# Patient Record
Sex: FEMALE | Race: WHITE | NOT HISPANIC OR LATINO | Employment: FULL TIME | URBAN - METROPOLITAN AREA
[De-identification: names, ages, dates, MRNs, and addresses within clinical notes are randomized per-mention and may not be internally consistent; named-entity substitution may affect disease eponyms.]

---

## 2017-01-26 ENCOUNTER — TELEPHONE (OUTPATIENT)
Dept: OBSTETRICS AND GYNECOLOGY | Facility: CLINIC | Age: 62
End: 2017-01-26

## 2017-01-26 DIAGNOSIS — N39.3 STRESS INCONTINENCE: Primary | ICD-10-CM

## 2017-01-26 RX ORDER — ESTRADIOL 1 MG/G
1 GEL TOPICAL DAILY
Qty: 9 G | Refills: 0 | Status: SHIPPED | OUTPATIENT
Start: 2017-01-26 | End: 2017-11-02

## 2017-01-26 RX ORDER — ESTRADIOL 0.25 MG/.25G
GEL TOPICAL
Status: CANCELLED | OUTPATIENT
Start: 2017-01-26

## 2017-01-26 RX ORDER — SODIUM CHLORIDE 0.9 % (FLUSH) 0.9 %
1-10 SYRINGE (ML) INJECTION AS NEEDED
Status: CANCELLED | OUTPATIENT
Start: 2017-01-26

## 2017-01-26 NOTE — TELEPHONE ENCOUNTER
----- Message from Tamara Phelps sent at 1/26/2017 10:42 AM EST -----  Regarding: needs estradiol switched  Contact: [phone]  Dr Garcia Pt called back weeks ago regarding Estrogel not being covered and never heard if it was being switched to something else    [facility].     Per Dr. Garcia give patient Divigel

## 2017-01-30 RX ORDER — ESTRADIOL 1 MG/G
1 GEL TOPICAL DAILY
Qty: 9 G | Refills: 0 | Status: SHIPPED | OUTPATIENT
Start: 2017-01-30 | End: 2017-11-02

## 2017-05-10 PROBLEM — IMO0002 CYSTOCELE: Status: ACTIVE | Noted: 2017-05-10

## 2017-07-06 RX ORDER — ESTRADIOL 1 MG/G
GEL TOPICAL
Qty: 90 G | Refills: 6 | Status: SHIPPED | OUTPATIENT
Start: 2017-07-06 | End: 2019-07-30

## 2017-09-29 ENCOUNTER — TELEPHONE (OUTPATIENT)
Dept: OBSTETRICS AND GYNECOLOGY | Facility: CLINIC | Age: 62
End: 2017-09-29

## 2017-10-03 ENCOUNTER — TELEPHONE (OUTPATIENT)
Dept: OBSTETRICS AND GYNECOLOGY | Facility: CLINIC | Age: 62
End: 2017-10-03

## 2017-10-03 NOTE — TELEPHONE ENCOUNTER
The second questions regarding her upcoming TVT on November 6.  She had read that she will be in the hospital for a week and off work for 3 months.  I told her that she would be a hospital hopefully overnight and off about 2 weeks with no heavy lifting ideally for the first 6 weeks.  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  Also wants to make sure this will not interfere with her sex life and it should not at all.  Should help if she doesn't have to worry about stress incontinence.

## 2017-11-02 ENCOUNTER — APPOINTMENT (OUTPATIENT)
Dept: PREADMISSION TESTING | Facility: HOSPITAL | Age: 62
End: 2017-11-02

## 2017-11-02 ENCOUNTER — OFFICE VISIT (OUTPATIENT)
Dept: OBSTETRICS AND GYNECOLOGY | Facility: CLINIC | Age: 62
End: 2017-11-02

## 2017-11-02 VITALS
BODY MASS INDEX: 27.96 KG/M2 | DIASTOLIC BLOOD PRESSURE: 80 MMHG | RESPIRATION RATE: 16 BRPM | WEIGHT: 168 LBS | SYSTOLIC BLOOD PRESSURE: 128 MMHG

## 2017-11-02 VITALS — HEIGHT: 65 IN | WEIGHT: 168.43 LBS | BODY MASS INDEX: 28.06 KG/M2

## 2017-11-02 DIAGNOSIS — N39.46 MIXED INCONTINENCE: ICD-10-CM

## 2017-11-02 DIAGNOSIS — N39.46 MIXED INCONTINENCE: Primary | ICD-10-CM

## 2017-11-02 PROBLEM — I10 HTN (HYPERTENSION): Status: ACTIVE | Noted: 2017-11-02

## 2017-11-02 PROBLEM — Z96.641 S/P HIP REPLACEMENT, RIGHT: Status: ACTIVE | Noted: 2017-11-02

## 2017-11-02 LAB
ANION GAP SERPL CALCULATED.3IONS-SCNC: 8 MMOL/L (ref 3–11)
BASOPHILS # BLD AUTO: 0.07 10*3/MM3 (ref 0–0.2)
BASOPHILS NFR BLD AUTO: 0.6 % (ref 0–1)
BUN BLD-MCNC: 14 MG/DL (ref 9–23)
BUN/CREAT SERPL: 20 (ref 7–25)
CALCIUM SPEC-SCNC: 9 MG/DL (ref 8.7–10.4)
CHLORIDE SERPL-SCNC: 104 MMOL/L (ref 99–109)
CO2 SERPL-SCNC: 25 MMOL/L (ref 20–31)
CREAT BLD-MCNC: 0.7 MG/DL (ref 0.6–1.3)
DEPRECATED RDW RBC AUTO: 45.4 FL (ref 37–54)
EOSINOPHIL # BLD AUTO: 0.14 10*3/MM3 (ref 0–0.3)
EOSINOPHIL NFR BLD AUTO: 1.2 % (ref 0–3)
ERYTHROCYTE [DISTWIDTH] IN BLOOD BY AUTOMATED COUNT: 12.6 % (ref 11.3–14.5)
GFR SERPL CREATININE-BSD FRML MDRD: 85 ML/MIN/1.73
GLUCOSE BLD-MCNC: 78 MG/DL (ref 70–100)
HCT VFR BLD AUTO: 47.6 % (ref 34.5–44)
HGB BLD-MCNC: 16.2 G/DL (ref 11.5–15.5)
IMM GRANULOCYTES # BLD: 0.05 10*3/MM3 (ref 0–0.03)
IMM GRANULOCYTES NFR BLD: 0.4 % (ref 0–0.6)
LYMPHOCYTES # BLD AUTO: 1.95 10*3/MM3 (ref 0.6–4.8)
LYMPHOCYTES NFR BLD AUTO: 16.5 % (ref 24–44)
MCH RBC QN AUTO: 33.5 PG (ref 27–31)
MCHC RBC AUTO-ENTMCNC: 34 G/DL (ref 32–36)
MCV RBC AUTO: 98.3 FL (ref 80–99)
MONOCYTES # BLD AUTO: 0.8 10*3/MM3 (ref 0–1)
MONOCYTES NFR BLD AUTO: 6.8 % (ref 0–12)
NEUTROPHILS # BLD AUTO: 8.81 10*3/MM3 (ref 1.5–8.3)
NEUTROPHILS NFR BLD AUTO: 74.5 % (ref 41–71)
PLATELET # BLD AUTO: 264 10*3/MM3 (ref 150–450)
PMV BLD AUTO: 10.7 FL (ref 6–12)
POTASSIUM BLD-SCNC: 4.1 MMOL/L (ref 3.5–5.5)
RBC # BLD AUTO: 4.84 10*6/MM3 (ref 3.89–5.14)
SODIUM BLD-SCNC: 137 MMOL/L (ref 132–146)
WBC NRBC COR # BLD: 11.82 10*3/MM3 (ref 3.5–10.8)

## 2017-11-02 PROCEDURE — S0260 H&P FOR SURGERY: HCPCS | Performed by: OBSTETRICS & GYNECOLOGY

## 2017-11-02 PROCEDURE — 36415 COLL VENOUS BLD VENIPUNCTURE: CPT

## 2017-11-02 PROCEDURE — 80048 BASIC METABOLIC PNL TOTAL CA: CPT | Performed by: OBSTETRICS & GYNECOLOGY

## 2017-11-02 PROCEDURE — 93010 ELECTROCARDIOGRAM REPORT: CPT | Performed by: INTERNAL MEDICINE

## 2017-11-02 PROCEDURE — 93005 ELECTROCARDIOGRAM TRACING: CPT

## 2017-11-02 PROCEDURE — 85025 COMPLETE CBC W/AUTO DIFF WBC: CPT | Performed by: OBSTETRICS & GYNECOLOGY

## 2017-11-02 RX ORDER — AMLODIPINE BESYLATE 2.5 MG/1
2.5 TABLET ORAL NIGHTLY
COMMUNITY

## 2017-11-02 RX ORDER — NICOTINE POLACRILEX 2 MG
1 GUM BUCCAL DAILY
COMMUNITY

## 2017-11-02 RX ORDER — MELOXICAM 7.5 MG/1
7.5 TABLET ORAL DAILY
COMMUNITY
End: 2017-11-07

## 2017-11-02 RX ORDER — IBANDRONATE SODIUM 150 MG/1
150 TABLET, FILM COATED ORAL
Qty: 3 TABLET | Refills: 5 | Status: SHIPPED | OUTPATIENT
Start: 2017-11-02 | End: 2019-01-16 | Stop reason: SDUPTHER

## 2017-11-02 RX ORDER — HYDROXYZINE HYDROCHLORIDE 25 MG/1
25 TABLET, FILM COATED ORAL 3 TIMES DAILY PRN
COMMUNITY
End: 2022-02-08

## 2017-11-02 RX ORDER — FUROSEMIDE 20 MG/1
20 TABLET ORAL DAILY
COMMUNITY
End: 2022-02-08

## 2017-11-02 RX ORDER — SODIUM CHLORIDE 0.9 % (FLUSH) 0.9 %
1-10 SYRINGE (ML) INJECTION AS NEEDED
Status: CANCELLED | OUTPATIENT
Start: 2017-11-02

## 2017-11-02 RX ORDER — LISINOPRIL 2.5 MG/1
2.5 TABLET ORAL NIGHTLY
COMMUNITY

## 2017-11-02 RX ORDER — MAGNESIUM 200 MG
TABLET ORAL
COMMUNITY
End: 2017-11-02

## 2017-11-02 NOTE — H&P
Subjective   Shannan Vigil is a 62 y.o. year old  who is scheduled  for surgery due to stress urinary incontinence( mixed ). S/p hysterectomy endometriosis. Failed Kegels and timed voids; interferes with ADL also wet in am - discussed that may persist. May need medical treatment - Vesicare did not help.    Past Medical History:   Diagnosis Date   • Arthritis    • Endometriosis     h/o   • Osteopenia      Past Surgical History:   Procedure Laterality Date   • HYSTERECTOMY      Complete w/ silva oophorectomy   • OOPHORECTOMY Bilateral      Social History     Social History   • Marital status:      Spouse name: N/A   • Number of children: N/A   • Years of education: N/A     Social History Main Topics   • Smoking status: Never Smoker   • Smokeless tobacco: Never Used   • Alcohol use Yes      Comment: social   • Drug use: No   • Sexual activity: Yes     Partners: Male     Other Topics Concern   • None     Social History Narrative       Current Outpatient Prescriptions:   •  amLODIPine (NORVASC) 2.5 MG tablet, Take 2.5 mg by mouth Daily., Disp: , Rfl:   •  Biotin 1 MG capsule, Take  by mouth., Disp: , Rfl:   •  Cholecalciferol (VITAMIN D3) 12647 UNITS tablet, Take 1 tablet by mouth 2 (Two) Times a Week., Disp: , Rfl:   •  Cyanocobalamin (VITAMIN B-12) 1000 MCG sublingual tablet, Place  under the tongue., Disp: , Rfl:   •  DIVIGEL 1 MG/GM gel, APPLY 1 PACKET ON SKIN DAILY, Disp: 90 g, Rfl: 6  •  furosemide (LASIX) 20 MG tablet, Take 20 mg by mouth 2 (Two) Times a Day., Disp: , Rfl:   •  hydrOXYzine (ATARAX) 25 MG tablet, Take 25 mg by mouth 3 (Three) Times a Day As Needed for Itching., Disp: , Rfl:   •  ibandronate (BONIVA) 150 MG tablet, Take 1 tablet by mouth Every 30 (Thirty) Days., Disp: 3 tablet, Rfl: 5  •  lisinopril (PRINIVIL,ZESTRIL) 2.5 MG tablet, Take 5 mg by mouth Daily., Disp: , Rfl:   •  meloxicam (MOBIC) 7.5 MG tablet, Take 7.5 mg by mouth Daily., Disp: , Rfl:   •  omeprazole (priLOSEC) 40  MG capsule, Take 40 mg by mouth Daily., Disp: , Rfl:   •  TESTOSTERONE IM, Inject 1 mL into the shoulder, thigh, or buttocks Every 30 (Thirty) Days., Disp: , Rfl:     No Known Allergies  Smoking status: Never Smoker                                                              Smokeless status: Never Used                        Review of Systems      Objective   /80  Resp 16  Wt 168 lb (76.2 kg)  BMI 27.96 kg/m2  General: well developed; well nourished  no acute distress  tanned   Heart: regular rate and rhythm, S1, S2 normal, no murmur, click, rub or gallop   Lungs: breathing is unlabored  clear to auscultation bilaterally   Abdomen: soft, non-tender; no masses  no umbilical or inginual hernias are present  no hepato-splenomegaly  incision is healed   Pelvis:: Labia slightly erythematous minimal cystocele while supine on bimanual examination there seems to be fairly good support to the apex and not significant cystocele.          Assessment   1. Mixed incontinence with stress component   2. Minimal cystocele.  May need to consider repair vaginally if TVT does not adequately support anterior component  3. S/p hysterectomy BSO  4. Recent right hip replacement in February 5. She has labs from Sept 2017     Plan   TVT Exact on Monday possible anterior colporrhaphy  NPO 8 hours   PAT   No shaving pubic hair( did today)      Ronnie Garcia M.D.  11/2/2017

## 2017-11-06 ENCOUNTER — ANESTHESIA EVENT (OUTPATIENT)
Dept: PERIOP | Facility: HOSPITAL | Age: 62
End: 2017-11-06

## 2017-11-06 ENCOUNTER — HOSPITAL ENCOUNTER (OUTPATIENT)
Facility: HOSPITAL | Age: 62
Discharge: HOME | End: 2017-11-07
Attending: OBSTETRICS & GYNECOLOGY | Admitting: OBSTETRICS & GYNECOLOGY

## 2017-11-06 ENCOUNTER — ANESTHESIA (OUTPATIENT)
Dept: PERIOP | Facility: HOSPITAL | Age: 62
End: 2017-11-06

## 2017-11-06 DIAGNOSIS — N39.3 STRESS INCONTINENCE IN FEMALE: ICD-10-CM

## 2017-11-06 DIAGNOSIS — N39.3 STRESS INCONTINENCE: ICD-10-CM

## 2017-11-06 DIAGNOSIS — N81.11 CYSTOCELE, MIDLINE: Primary | ICD-10-CM

## 2017-11-06 LAB — POTASSIUM BLDA-SCNC: 4 MMOL/L (ref 3.5–5.3)

## 2017-11-06 PROCEDURE — 84132 ASSAY OF SERUM POTASSIUM: CPT | Performed by: ANESTHESIOLOGY

## 2017-11-06 PROCEDURE — 25010000002 MIDAZOLAM PER 1 MG: Performed by: NURSE ANESTHETIST, CERTIFIED REGISTERED

## 2017-11-06 PROCEDURE — 25010000002 KETOROLAC TROMETHAMINE PER 15 MG: Performed by: NURSE ANESTHETIST, CERTIFIED REGISTERED

## 2017-11-06 PROCEDURE — 25010000003 CEFAZOLIN IN DEXTROSE 2-4 GM/100ML-% SOLUTION: Performed by: OBSTETRICS & GYNECOLOGY

## 2017-11-06 PROCEDURE — 57288 REPAIR BLADDER DEFECT: CPT | Performed by: OBSTETRICS & GYNECOLOGY

## 2017-11-06 PROCEDURE — 25010000002 DEXAMETHASONE PER 1 MG: Performed by: NURSE ANESTHETIST, CERTIFIED REGISTERED

## 2017-11-06 PROCEDURE — 25010000002 PHENYLEPHRINE PER 1 ML: Performed by: OBSTETRICS & GYNECOLOGY

## 2017-11-06 PROCEDURE — 57240 ANTERIOR COLPORRHAPHY: CPT | Performed by: OBSTETRICS & GYNECOLOGY

## 2017-11-06 PROCEDURE — C1771 REP DEV, URINARY, W/SLING: HCPCS | Performed by: OBSTETRICS & GYNECOLOGY

## 2017-11-06 PROCEDURE — 25010000002 PROPOFOL 10 MG/ML EMULSION: Performed by: NURSE ANESTHETIST, CERTIFIED REGISTERED

## 2017-11-06 PROCEDURE — 25010000002 ONDANSETRON PER 1 MG: Performed by: NURSE ANESTHETIST, CERTIFIED REGISTERED

## 2017-11-06 PROCEDURE — 25010000002 FENTANYL CITRATE (PF) 100 MCG/2ML SOLUTION: Performed by: NURSE ANESTHETIST, CERTIFIED REGISTERED

## 2017-11-06 PROCEDURE — 25010000002 SUCCINYLCHOLINE PER 20 MG: Performed by: NURSE ANESTHETIST, CERTIFIED REGISTERED

## 2017-11-06 DEVICE — SLNG TVT EXACT CONTINENCE SYS BX/1EA: Type: IMPLANTABLE DEVICE | Site: UTERUS | Status: FUNCTIONAL

## 2017-11-06 RX ORDER — DEXAMETHASONE SODIUM PHOSPHATE 4 MG/ML
INJECTION, SOLUTION INTRA-ARTICULAR; INTRALESIONAL; INTRAMUSCULAR; INTRAVENOUS; SOFT TISSUE AS NEEDED
Status: DISCONTINUED | OUTPATIENT
Start: 2017-11-06 | End: 2017-11-06 | Stop reason: SURG

## 2017-11-06 RX ORDER — PROMETHAZINE HYDROCHLORIDE 25 MG/ML
6.25 INJECTION, SOLUTION INTRAMUSCULAR; INTRAVENOUS ONCE AS NEEDED
Status: DISCONTINUED | OUTPATIENT
Start: 2017-11-06 | End: 2017-11-06 | Stop reason: HOSPADM

## 2017-11-06 RX ORDER — ALBUTEROL SULFATE 90 UG/1
AEROSOL, METERED RESPIRATORY (INHALATION) AS NEEDED
Status: DISCONTINUED | OUTPATIENT
Start: 2017-11-06 | End: 2017-11-06 | Stop reason: SURG

## 2017-11-06 RX ORDER — MIDAZOLAM HYDROCHLORIDE 1 MG/ML
INJECTION INTRAMUSCULAR; INTRAVENOUS AS NEEDED
Status: DISCONTINUED | OUTPATIENT
Start: 2017-11-06 | End: 2017-11-06 | Stop reason: SURG

## 2017-11-06 RX ORDER — ACETAMINOPHEN 650 MG/1
650 SUPPOSITORY RECTAL EVERY 4 HOURS PRN
Status: DISCONTINUED | OUTPATIENT
Start: 2017-11-06 | End: 2017-11-07 | Stop reason: HOSPADM

## 2017-11-06 RX ORDER — ONDANSETRON 4 MG/1
4 TABLET, FILM COATED ORAL EVERY 6 HOURS PRN
Status: DISCONTINUED | OUTPATIENT
Start: 2017-11-06 | End: 2017-11-07 | Stop reason: HOSPADM

## 2017-11-06 RX ORDER — SODIUM CHLORIDE 0.9 % (FLUSH) 0.9 %
1-10 SYRINGE (ML) INJECTION AS NEEDED
Status: DISCONTINUED | OUTPATIENT
Start: 2017-11-06 | End: 2017-11-06 | Stop reason: HOSPADM

## 2017-11-06 RX ORDER — ACETAMINOPHEN 325 MG/1
650 TABLET ORAL EVERY 4 HOURS PRN
Status: DISCONTINUED | OUTPATIENT
Start: 2017-11-06 | End: 2017-11-07 | Stop reason: HOSPADM

## 2017-11-06 RX ORDER — PANTOPRAZOLE SODIUM 40 MG/1
40 TABLET, DELAYED RELEASE ORAL NIGHTLY
Status: DISCONTINUED | OUTPATIENT
Start: 2017-11-06 | End: 2017-11-07 | Stop reason: HOSPADM

## 2017-11-06 RX ORDER — ONDANSETRON 2 MG/ML
4 INJECTION INTRAMUSCULAR; INTRAVENOUS EVERY 6 HOURS PRN
Status: DISCONTINUED | OUTPATIENT
Start: 2017-11-06 | End: 2017-11-07 | Stop reason: HOSPADM

## 2017-11-06 RX ORDER — MAGNESIUM HYDROXIDE 1200 MG/15ML
LIQUID ORAL AS NEEDED
Status: DISCONTINUED | OUTPATIENT
Start: 2017-11-06 | End: 2017-11-06 | Stop reason: HOSPADM

## 2017-11-06 RX ORDER — MEPERIDINE HYDROCHLORIDE 50 MG/ML
12.5 INJECTION INTRAMUSCULAR; INTRAVENOUS; SUBCUTANEOUS
Status: DISCONTINUED | OUTPATIENT
Start: 2017-11-06 | End: 2017-11-06 | Stop reason: HOSPADM

## 2017-11-06 RX ORDER — HYDROCODONE BITARTRATE AND ACETAMINOPHEN 5; 325 MG/1; MG/1
1 TABLET ORAL EVERY 4 HOURS PRN
Status: DISCONTINUED | OUTPATIENT
Start: 2017-11-06 | End: 2017-11-07 | Stop reason: HOSPADM

## 2017-11-06 RX ORDER — PROMETHAZINE HYDROCHLORIDE 25 MG/1
25 SUPPOSITORY RECTAL ONCE AS NEEDED
Status: DISCONTINUED | OUTPATIENT
Start: 2017-11-06 | End: 2017-11-06 | Stop reason: HOSPADM

## 2017-11-06 RX ORDER — SODIUM CHLORIDE, SODIUM LACTATE, POTASSIUM CHLORIDE, CALCIUM CHLORIDE 600; 310; 30; 20 MG/100ML; MG/100ML; MG/100ML; MG/100ML
9 INJECTION, SOLUTION INTRAVENOUS CONTINUOUS
Status: DISCONTINUED | OUTPATIENT
Start: 2017-11-06 | End: 2017-11-06

## 2017-11-06 RX ORDER — KETOROLAC TROMETHAMINE 30 MG/ML
INJECTION, SOLUTION INTRAMUSCULAR; INTRAVENOUS AS NEEDED
Status: DISCONTINUED | OUTPATIENT
Start: 2017-11-06 | End: 2017-11-06 | Stop reason: SURG

## 2017-11-06 RX ORDER — PROPOFOL 10 MG/ML
VIAL (ML) INTRAVENOUS AS NEEDED
Status: DISCONTINUED | OUTPATIENT
Start: 2017-11-06 | End: 2017-11-06 | Stop reason: SURG

## 2017-11-06 RX ORDER — FAMOTIDINE 10 MG/ML
20 INJECTION, SOLUTION INTRAVENOUS ONCE
Status: DISCONTINUED | OUTPATIENT
Start: 2017-11-06 | End: 2017-11-06 | Stop reason: HOSPADM

## 2017-11-06 RX ORDER — LIDOCAINE HYDROCHLORIDE 10 MG/ML
0.5 INJECTION, SOLUTION EPIDURAL; INFILTRATION; INTRACAUDAL; PERINEURAL ONCE AS NEEDED
Status: COMPLETED | OUTPATIENT
Start: 2017-11-06 | End: 2017-11-06

## 2017-11-06 RX ORDER — SIMETHICONE 80 MG
80 TABLET,CHEWABLE ORAL 4 TIMES DAILY PRN
Status: DISCONTINUED | OUTPATIENT
Start: 2017-11-06 | End: 2017-11-07 | Stop reason: HOSPADM

## 2017-11-06 RX ORDER — FUROSEMIDE 20 MG/1
20 TABLET ORAL DAILY
Status: DISCONTINUED | OUTPATIENT
Start: 2017-11-06 | End: 2017-11-07 | Stop reason: HOSPADM

## 2017-11-06 RX ORDER — SODIUM CHLORIDE 9 MG/ML
INJECTION, SOLUTION INTRAVENOUS CONTINUOUS PRN
Status: DISCONTINUED | OUTPATIENT
Start: 2017-11-06 | End: 2017-11-06 | Stop reason: HOSPADM

## 2017-11-06 RX ORDER — AMLODIPINE BESYLATE 2.5 MG/1
2.5 TABLET ORAL NIGHTLY
Status: DISCONTINUED | OUTPATIENT
Start: 2017-11-06 | End: 2017-11-07 | Stop reason: HOSPADM

## 2017-11-06 RX ORDER — CEFAZOLIN SODIUM 2 G/100ML
2 INJECTION, SOLUTION INTRAVENOUS
Status: DISCONTINUED | OUTPATIENT
Start: 2017-11-06 | End: 2017-11-06 | Stop reason: HOSPADM

## 2017-11-06 RX ORDER — KETOROLAC TROMETHAMINE 30 MG/ML
30 INJECTION, SOLUTION INTRAMUSCULAR; INTRAVENOUS EVERY 6 HOURS PRN
Status: DISCONTINUED | OUTPATIENT
Start: 2017-11-06 | End: 2017-11-07 | Stop reason: HOSPADM

## 2017-11-06 RX ORDER — HYDROXYZINE HYDROCHLORIDE 25 MG/1
25 TABLET, FILM COATED ORAL 3 TIMES DAILY PRN
Status: DISCONTINUED | OUTPATIENT
Start: 2017-11-06 | End: 2017-11-07 | Stop reason: HOSPADM

## 2017-11-06 RX ORDER — LIDOCAINE HYDROCHLORIDE 10 MG/ML
INJECTION, SOLUTION EPIDURAL; INFILTRATION; INTRACAUDAL; PERINEURAL AS NEEDED
Status: DISCONTINUED | OUTPATIENT
Start: 2017-11-06 | End: 2017-11-06 | Stop reason: SURG

## 2017-11-06 RX ORDER — HYDROMORPHONE HYDROCHLORIDE 1 MG/ML
0.5 INJECTION, SOLUTION INTRAMUSCULAR; INTRAVENOUS; SUBCUTANEOUS
Status: DISCONTINUED | OUTPATIENT
Start: 2017-11-06 | End: 2017-11-06 | Stop reason: HOSPADM

## 2017-11-06 RX ORDER — IBUPROFEN 400 MG/1
400 TABLET ORAL EVERY 6 HOURS PRN
Status: DISCONTINUED | OUTPATIENT
Start: 2017-11-06 | End: 2017-11-07 | Stop reason: HOSPADM

## 2017-11-06 RX ORDER — HYDROCODONE BITARTRATE AND ACETAMINOPHEN 10; 325 MG/1; MG/1
1 TABLET ORAL EVERY 4 HOURS PRN
Status: DISCONTINUED | OUTPATIENT
Start: 2017-11-06 | End: 2017-11-07 | Stop reason: HOSPADM

## 2017-11-06 RX ORDER — MEPERIDINE HYDROCHLORIDE 25 MG/ML
12.5 INJECTION INTRAMUSCULAR; INTRAVENOUS; SUBCUTANEOUS
Status: DISCONTINUED | OUTPATIENT
Start: 2017-11-06 | End: 2017-11-06 | Stop reason: HOSPADM

## 2017-11-06 RX ORDER — FENTANYL CITRATE 50 UG/ML
INJECTION, SOLUTION INTRAMUSCULAR; INTRAVENOUS AS NEEDED
Status: DISCONTINUED | OUTPATIENT
Start: 2017-11-06 | End: 2017-11-06 | Stop reason: SURG

## 2017-11-06 RX ORDER — POTASSIUM CHLORIDE 750 MG/1
10 CAPSULE, EXTENDED RELEASE ORAL EVERY EVENING
Status: DISCONTINUED | OUTPATIENT
Start: 2017-11-06 | End: 2017-11-07 | Stop reason: HOSPADM

## 2017-11-06 RX ORDER — ONDANSETRON 2 MG/ML
INJECTION INTRAMUSCULAR; INTRAVENOUS AS NEEDED
Status: DISCONTINUED | OUTPATIENT
Start: 2017-11-06 | End: 2017-11-06 | Stop reason: SURG

## 2017-11-06 RX ORDER — ESMOLOL HYDROCHLORIDE 10 MG/ML
INJECTION INTRAVENOUS AS NEEDED
Status: DISCONTINUED | OUTPATIENT
Start: 2017-11-06 | End: 2017-11-06 | Stop reason: SURG

## 2017-11-06 RX ORDER — DOCUSATE SODIUM 100 MG/1
100 CAPSULE, LIQUID FILLED ORAL 2 TIMES DAILY PRN
Status: DISCONTINUED | OUTPATIENT
Start: 2017-11-06 | End: 2017-11-07 | Stop reason: HOSPADM

## 2017-11-06 RX ORDER — CEFAZOLIN SODIUM 2 G/100ML
2 INJECTION, SOLUTION INTRAVENOUS EVERY 8 HOURS
Status: COMPLETED | OUTPATIENT
Start: 2017-11-06 | End: 2017-11-07

## 2017-11-06 RX ORDER — FENTANYL CITRATE 50 UG/ML
50 INJECTION, SOLUTION INTRAMUSCULAR; INTRAVENOUS
Status: DISCONTINUED | OUTPATIENT
Start: 2017-11-06 | End: 2017-11-06 | Stop reason: HOSPADM

## 2017-11-06 RX ORDER — BISACODYL 5 MG/1
10 TABLET, DELAYED RELEASE ORAL DAILY PRN
Status: DISCONTINUED | OUTPATIENT
Start: 2017-11-06 | End: 2017-11-07 | Stop reason: HOSPADM

## 2017-11-06 RX ORDER — BUPIVACAINE HYDROCHLORIDE 5 MG/ML
INJECTION, SOLUTION PERINEURAL AS NEEDED
Status: DISCONTINUED | OUTPATIENT
Start: 2017-11-06 | End: 2017-11-06 | Stop reason: HOSPADM

## 2017-11-06 RX ORDER — FAMOTIDINE 20 MG/1
20 TABLET, FILM COATED ORAL ONCE
Status: COMPLETED | OUTPATIENT
Start: 2017-11-06 | End: 2017-11-06

## 2017-11-06 RX ORDER — SUCCINYLCHOLINE CHLORIDE 20 MG/ML
INJECTION INTRAMUSCULAR; INTRAVENOUS AS NEEDED
Status: DISCONTINUED | OUTPATIENT
Start: 2017-11-06 | End: 2017-11-06 | Stop reason: SURG

## 2017-11-06 RX ORDER — LISINOPRIL 2.5 MG/1
2.5 TABLET ORAL NIGHTLY
Status: DISCONTINUED | OUTPATIENT
Start: 2017-11-06 | End: 2017-11-07 | Stop reason: HOSPADM

## 2017-11-06 RX ORDER — IPRATROPIUM BROMIDE AND ALBUTEROL SULFATE 2.5; .5 MG/3ML; MG/3ML
3 SOLUTION RESPIRATORY (INHALATION) ONCE AS NEEDED
Status: DISCONTINUED | OUTPATIENT
Start: 2017-11-06 | End: 2017-11-06 | Stop reason: HOSPADM

## 2017-11-06 RX ORDER — PROMETHAZINE HYDROCHLORIDE 25 MG/1
25 TABLET ORAL ONCE AS NEEDED
Status: DISCONTINUED | OUTPATIENT
Start: 2017-11-06 | End: 2017-11-06 | Stop reason: HOSPADM

## 2017-11-06 RX ORDER — ZOLPIDEM TARTRATE 5 MG/1
5 TABLET ORAL NIGHTLY PRN
Status: DISCONTINUED | OUTPATIENT
Start: 2017-11-06 | End: 2017-11-07 | Stop reason: HOSPADM

## 2017-11-06 RX ORDER — ESTRADIOL 0.05 MG/D
1 FILM, EXTENDED RELEASE TRANSDERMAL 2 TIMES WEEKLY
Status: DISCONTINUED | OUTPATIENT
Start: 2017-11-09 | End: 2017-11-07 | Stop reason: HOSPADM

## 2017-11-06 RX ORDER — DEXTROSE AND SODIUM CHLORIDE 5; .45 G/100ML; G/100ML
100 INJECTION, SOLUTION INTRAVENOUS CONTINUOUS
Status: DISCONTINUED | OUTPATIENT
Start: 2017-11-06 | End: 2017-11-07 | Stop reason: HOSPADM

## 2017-11-06 RX ADMIN — PROPOFOL 200 MG: 10 INJECTION, EMULSION INTRAVENOUS at 13:01

## 2017-11-06 RX ADMIN — LIDOCAINE HYDROCHLORIDE 50 MG: 10 INJECTION, SOLUTION EPIDURAL; INFILTRATION; INTRACAUDAL; PERINEURAL at 13:01

## 2017-11-06 RX ADMIN — KETOROLAC TROMETHAMINE 30 MG: 30 INJECTION, SOLUTION INTRAMUSCULAR at 14:32

## 2017-11-06 RX ADMIN — ESMOLOL HYDROCHLORIDE 20 MG: 10 INJECTION, SOLUTION INTRAVENOUS at 14:21

## 2017-11-06 RX ADMIN — LIDOCAINE HYDROCHLORIDE 2 ML: 10 INJECTION, SOLUTION EPIDURAL; INFILTRATION; INTRACAUDAL; PERINEURAL at 12:13

## 2017-11-06 RX ADMIN — SODIUM CHLORIDE, POTASSIUM CHLORIDE, SODIUM LACTATE AND CALCIUM CHLORIDE 9 ML/HR: 600; 310; 30; 20 INJECTION, SOLUTION INTRAVENOUS at 12:13

## 2017-11-06 RX ADMIN — MIDAZOLAM HYDROCHLORIDE 2 MG: 1 INJECTION, SOLUTION INTRAMUSCULAR; INTRAVENOUS at 12:54

## 2017-11-06 RX ADMIN — SODIUM CHLORIDE, POTASSIUM CHLORIDE, SODIUM LACTATE AND CALCIUM CHLORIDE: 600; 310; 30; 20 INJECTION, SOLUTION INTRAVENOUS at 14:14

## 2017-11-06 RX ADMIN — ONDANSETRON 4 MG: 2 INJECTION INTRAMUSCULAR; INTRAVENOUS at 14:32

## 2017-11-06 RX ADMIN — FENTANYL CITRATE 50 MCG: 50 INJECTION, SOLUTION INTRAMUSCULAR; INTRAVENOUS at 14:18

## 2017-11-06 RX ADMIN — LISINOPRIL 2.5 MG: 2.5 TABLET ORAL at 20:08

## 2017-11-06 RX ADMIN — HYDROCODONE BITARTRATE AND ACETAMINOPHEN 1 TABLET: 10; 325 TABLET ORAL at 20:09

## 2017-11-06 RX ADMIN — FENTANYL CITRATE 50 MCG: 50 INJECTION, SOLUTION INTRAMUSCULAR; INTRAVENOUS at 13:01

## 2017-11-06 RX ADMIN — MEPERIDINE HYDROCHLORIDE 12.5 MG: 50 INJECTION, SOLUTION INTRAMUSCULAR; INTRAVENOUS; SUBCUTANEOUS at 15:05

## 2017-11-06 RX ADMIN — FENTANYL CITRATE 50 MCG: 50 INJECTION, SOLUTION INTRAMUSCULAR; INTRAVENOUS at 14:21

## 2017-11-06 RX ADMIN — FENTANYL CITRATE 50 MCG: 50 INJECTION INTRAMUSCULAR; INTRAVENOUS at 15:30

## 2017-11-06 RX ADMIN — FENTANYL CITRATE 50 MCG: 50 INJECTION, SOLUTION INTRAMUSCULAR; INTRAVENOUS at 13:06

## 2017-11-06 RX ADMIN — ALBUTEROL SULFATE 4 PUFF: 90 AEROSOL, METERED RESPIRATORY (INHALATION) at 13:17

## 2017-11-06 RX ADMIN — AMLODIPINE BESYLATE 2.5 MG: 2.5 TABLET ORAL at 20:08

## 2017-11-06 RX ADMIN — POTASSIUM CHLORIDE 10 MEQ: 750 CAPSULE, EXTENDED RELEASE ORAL at 16:53

## 2017-11-06 RX ADMIN — PANTOPRAZOLE SODIUM 40 MG: 40 TABLET, DELAYED RELEASE ORAL at 20:09

## 2017-11-06 RX ADMIN — FAMOTIDINE 20 MG: 20 TABLET, FILM COATED ORAL at 12:13

## 2017-11-06 RX ADMIN — FENTANYL CITRATE 50 MCG: 50 INJECTION INTRAMUSCULAR; INTRAVENOUS at 15:20

## 2017-11-06 RX ADMIN — CEFAZOLIN SODIUM 2 G: 2 INJECTION, SOLUTION INTRAVENOUS at 21:48

## 2017-11-06 RX ADMIN — SUCCINYLCHOLINE CHLORIDE 100 MG: 20 INJECTION, SOLUTION INTRAMUSCULAR; INTRAVENOUS at 13:01

## 2017-11-06 RX ADMIN — FUROSEMIDE 20 MG: 20 TABLET ORAL at 16:55

## 2017-11-06 RX ADMIN — DEXTROSE AND SODIUM CHLORIDE 100 ML/HR: 5; 450 INJECTION, SOLUTION INTRAVENOUS at 15:56

## 2017-11-06 RX ADMIN — CEFAZOLIN SODIUM 2 G: 2 INJECTION, SOLUTION INTRAVENOUS at 12:59

## 2017-11-06 RX ADMIN — SODIUM CHLORIDE 500 ML: 9 INJECTION, SOLUTION INTRAVENOUS at 15:37

## 2017-11-06 RX ADMIN — ESMOLOL HYDROCHLORIDE 10 MG: 10 INJECTION, SOLUTION INTRAVENOUS at 14:37

## 2017-11-06 RX ADMIN — ESMOLOL HYDROCHLORIDE 20 MG: 10 INJECTION, SOLUTION INTRAVENOUS at 14:29

## 2017-11-06 RX ADMIN — DEXAMETHASONE SODIUM PHOSPHATE 8 MG: 4 INJECTION, SOLUTION INTRAMUSCULAR; INTRAVENOUS at 13:05

## 2017-11-06 RX ADMIN — EPHEDRINE SULFATE 5 MG: 50 INJECTION INTRAMUSCULAR; INTRAVENOUS; SUBCUTANEOUS at 14:05

## 2017-11-06 NOTE — H&P (VIEW-ONLY)
Subjective   Shannan Vigil is a 62 y.o. year old  who is scheduled  for surgery due to stress urinary incontinence( mixed ). S/p hysterectomy endometriosis. Failed Kegels and timed voids; interferes with ADL also wet in am - discussed that may persist. May need medical treatment - Vesicare did not help.    Past Medical History:   Diagnosis Date   • Arthritis    • Endometriosis     h/o   • Osteopenia      Past Surgical History:   Procedure Laterality Date   • HYSTERECTOMY      Complete w/ silva oophorectomy   • OOPHORECTOMY Bilateral      Social History     Social History   • Marital status:      Spouse name: N/A   • Number of children: N/A   • Years of education: N/A     Social History Main Topics   • Smoking status: Never Smoker   • Smokeless tobacco: Never Used   • Alcohol use Yes      Comment: social   • Drug use: No   • Sexual activity: Yes     Partners: Male     Other Topics Concern   • None     Social History Narrative       Current Outpatient Prescriptions:   •  amLODIPine (NORVASC) 2.5 MG tablet, Take 2.5 mg by mouth Daily., Disp: , Rfl:   •  Biotin 1 MG capsule, Take  by mouth., Disp: , Rfl:   •  Cholecalciferol (VITAMIN D3) 14145 UNITS tablet, Take 1 tablet by mouth 2 (Two) Times a Week., Disp: , Rfl:   •  Cyanocobalamin (VITAMIN B-12) 1000 MCG sublingual tablet, Place  under the tongue., Disp: , Rfl:   •  DIVIGEL 1 MG/GM gel, APPLY 1 PACKET ON SKIN DAILY, Disp: 90 g, Rfl: 6  •  furosemide (LASIX) 20 MG tablet, Take 20 mg by mouth 2 (Two) Times a Day., Disp: , Rfl:   •  hydrOXYzine (ATARAX) 25 MG tablet, Take 25 mg by mouth 3 (Three) Times a Day As Needed for Itching., Disp: , Rfl:   •  ibandronate (BONIVA) 150 MG tablet, Take 1 tablet by mouth Every 30 (Thirty) Days., Disp: 3 tablet, Rfl: 5  •  lisinopril (PRINIVIL,ZESTRIL) 2.5 MG tablet, Take 5 mg by mouth Daily., Disp: , Rfl:   •  meloxicam (MOBIC) 7.5 MG tablet, Take 7.5 mg by mouth Daily., Disp: , Rfl:   •  omeprazole (priLOSEC) 40  MG capsule, Take 40 mg by mouth Daily., Disp: , Rfl:   •  TESTOSTERONE IM, Inject 1 mL into the shoulder, thigh, or buttocks Every 30 (Thirty) Days., Disp: , Rfl:     No Known Allergies  Smoking status: Never Smoker                                                              Smokeless status: Never Used                        Review of Systems      Objective   /80  Resp 16  Wt 168 lb (76.2 kg)  BMI 27.96 kg/m2  General: well developed; well nourished  no acute distress  tanned   Heart: regular rate and rhythm, S1, S2 normal, no murmur, click, rub or gallop   Lungs: breathing is unlabored  clear to auscultation bilaterally   Abdomen: soft, non-tender; no masses  no umbilical or inginual hernias are present  no hepato-splenomegaly  incision is healed   Pelvis:: Labia slightly erythematous minimal cystocele while supine on bimanual examination there seems to be fairly good support to the apex and not significant cystocele.          Assessment   1. Mixed incontinence with stress component   2. Minimal cystocele.  May need to consider repair vaginally if TVT does not adequately support anterior component  3. S/p hysterectomy BSO  4. Recent right hip replacement in February 5. She has labs from Sept 2017     Plan   TVT Exact on Monday possible anterior colporrhaphy  NPO 8 hours   PAT   No shaving pubic hair( did today)      Ronnie Garcia M.D.  11/2/2017

## 2017-11-06 NOTE — ANESTHESIA PROCEDURE NOTES
Airway  Urgency: elective    Airway not difficult    General Information and Staff    Patient location during procedure: OR  CRNA: WYATT JUDGE    Indications and Patient Condition  Indications for airway management: airway protection    Preoxygenated: yes  MILS not maintained throughout  Mask difficulty assessment: 1 - vent by mask    Final Airway Details  Final airway type: endotracheal airway      Successful airway: ETT  Cuffed: yes   Successful intubation technique: direct laryngoscopy  Facilitating devices/methods: intubating stylet  Endotracheal tube insertion site: oral  Blade: Violet  Blade size: #3  ETT size: 7.0 mm  Cormack-Lehane Classification: grade I - full view of glottis  Placement verified by: chest auscultation and capnometry   Cuff volume (mL): 6  Measured from: lips  ETT to lips (cm): 20  Number of attempts at approach: 1    Additional Comments  Patient history, labs, physical exam, anesthetic plan reviewed with MDA and patient in preop.  Pt transported to room via RN. All ASA monitors applied, preoxygenated x 3 min/ ETO2 of >85, IV patent, smooth induction, easy intubation, + ETCO2, negative epigastric sounds, breath sound equal bilaterally with symmetric chest rise and fall, tube secured, all VSS, cspine neutrality maintained throughout induction, intubation and postitioning, all ppp, arms <90.

## 2017-11-06 NOTE — ANESTHESIA PROCEDURE NOTES
Airway  Urgency: elective    Airway not difficult    General Information and Staff    Patient location during procedure: OR  CRNA: WYATT JUDGE    Indications and Patient Condition  Indications for airway management: airway protection    Preoxygenated: yes  Mask difficulty assessment: 1 - vent by mask    Final Airway Details  Final airway type: supraglottic airway      Successful airway: classic  Size 4    Number of attempts at approach: 1    Additional Comments  LMA placed without difficulty, ventilation with assist, equal breath sounds and symmetric chest rise and fall

## 2017-11-06 NOTE — PLAN OF CARE
Problem: Pain, Acute (Adult)  Goal: Identify Related Risk Factors and Signs and Symptoms  Outcome: Outcome(s) achieved Date Met:  11/06/17 11/06/17 1803   Pain, Acute   Related Risk Factors (Acute Pain) procedure/treatment;surgery;infection;knowledge deficit;patient perception;positioning   Signs and Symptoms (Acute Pain) nausea/vomiting/anorexia;verbalization of pain descriptors;sleep pattern alteration       Goal: Acceptable Pain Control/Comfort Level  Outcome: Ongoing (interventions implemented as appropriate)    11/06/17 1803   Pain, Acute (Adult)   Acceptable Pain Control/Comfort Level making progress toward outcome

## 2017-11-06 NOTE — ANESTHESIA POSTPROCEDURE EVALUATION
Patient: Shannan Vigil    Procedure Summary     Date Anesthesia Start Anesthesia Stop Room / Location    11/06/17 1259  [facility] / [facility]       Procedure Diagnosis Surgeon Provider    TENSION FREE VAGINAL TAPING EXACT WITH  ANTERIOR AND POSTERIOR REPAIR (N/A Uterus) Stress incontinence  (Stress incontinence [N39.3]) MD Troy Lundberg MD          Anesthesia Type: general  Last vitals  /75   Temp 97.1   Pulse 106   Resp 16   SpO2 95     Post Anesthesia Care and Evaluation    Patient location during evaluation: PACU  Patient participation: complete - patient participated  Level of consciousness: awake and alert  Pain score: 0  Pain management: adequate  Airway patency: patent  Anesthetic complications: No anesthetic complications  PONV Status: none  Cardiovascular status: hemodynamically stable and acceptable  Respiratory status: nonlabored ventilation, acceptable and nasal cannula  Hydration status: acceptable

## 2017-11-06 NOTE — OP NOTE
Operative report    Preop diagnosis: Stress urinary incontinence (mixed).  Cystocele                              Status post and all hysterectomy and bilateral salpingo-oophorectomy    Postoperative diagnosis: Same    Procedure: Tension-free vaginal taping exact, associated cystoscopy                      Anterior colporrhaphy    Gen. anesthesia via LMA    Estimated blood loss: Less than 100 mL    Brief history and indications: This patient is a 62-year-old  3 para 2 who's had a history of urinary complaints.  She had mixed incontinence.  She was tried on anticholinergics and continued to have stress urinary incontinence.   Frequency had improved.  She had tried and failed Kegel exercises.  However the stress incontinence is interfering with activities of daily living.  She had a mild cystocele as well.  No rectal complaints or bowel movement complaints.  She failed conservative medical management.  She did have urethral descent and leakage on exam.  Options were discussed she understood risks and complications of the planned procedure included but were not limited to: failure of cure, urinary retention requiring self-catheterization, damage to surrounding structures including bladder, urethra and ureter.  They had opportunity to ask and have her questions answered satisfactorily she was scheduled for surgery.    Procedure: Patient taken operating room and placed under general anesthesia without complication.  She was prepped and draped standard fashion in a dorsal lithotomy position.  Exam revealed slight cystocele and rectocele along with urethrocele.  Exam under anesthesia revealed no adnexal or pelvic masses.  The 22 [place] Storz cystoscope was used. There were minor bleeding from  the urethra.  Initially, it was difficult to focus the camera.  No abnormalities were noted.  The bladder was drained.    Local was used suprapubically and stab wounds were made for the exit sites of the TVT trochars.  Local  "was used mid position suburethrally and bilaterally.  An incision was made in the vaginal mucosa at the mid urethra.  This was opened up bilaterally with Metzenbaum scissors.  The urethra was deviated to the right while the TVT exact was passed on the left side.  The trocar exited through the previous stab incision and was grasped suprapubically with a ring forcep.  A smaller 17 [place] [employer] cystoscope was used.  The trocar was in proper position- no foreign body was noted within the bladder.  The plastic sheath was brought up through the abdomen and the TVT tape tagged and cut.  The bladder was drained.  The urethral guide was used to deviate the urethra to the left while the TVT exact was passed on the right side towards the ipsilateral shoulder.  It exited through the stab incision on the right side.  The cystoscope was used to determine there was no foreign body/TVT trocar within the bladder.  The plastic sheath was brought up through the stab incision the tape tagged and cut.  The tape was  adjusted to the point where only a minimal amount of urine came out of the urethra with suprapubic pressure.  The Hegar trocar was freely mobile and there was no \"bump\" felt when passing the Hegar dilator.  The incision was closed with a chromic suture.  The stab incisions were closed with 3-0 plain sutures subcuticularly.  Hospital slight cystocele remained.  Dilute Doron-Synephrine was injected in the midline from just below the TVT incision to the vaginal cuff.  This cuff was grasped with an Allis clamp.  There was a band in the midline and there was released carefully with touch cautery.  The vaginal mucosa was teased away from the underlying vesicovaginal fascia using Metzenbaum scissors and a wet Ray-Aravind over gloved finger.  A small amount of excess vaginal mucosa was trimmed away.  Pursestring Vicryl suture was used to close the reinforce the cystocele.  A series of horizontal mattress sutures were placed in a second " layer.  Chromic was used to reapproximate the vaginal mucosa.  The vagina was irrigated and found to be hemostatic.    Sponge and needle counts were correct.  Reyes was placed to straight drainage.  Patient was awakened and taken to postop recovery area in stable condition.     Ronnie Garcia MD

## 2017-11-06 NOTE — PLAN OF CARE
Problem: Perioperative Period (Adult)  Goal: Signs and Symptoms of Listed Potential Problems Will be Absent or Manageable (Perioperative Period)  Outcome: Ongoing (interventions implemented as appropriate)    11/06/17 1152   Perioperative Period   Problems Assessed (Perioperative Period) pain   Problems Present (Perioperative Period) none

## 2017-11-06 NOTE — PLAN OF CARE
Problem: Patient Care Overview (Adult)  Goal: Plan of Care Review  Outcome: Ongoing (interventions implemented as appropriate)    11/06/17 1802   Coping/Psychosocial Response Interventions   Plan Of Care Reviewed With patient   Patient Care Overview   Progress progress toward functional goals as expected       Goal: Adult Individualization and Mutuality  Outcome: Ongoing (interventions implemented as appropriate)

## 2017-11-06 NOTE — ANESTHESIA PREPROCEDURE EVALUATION
Anesthesia Evaluation     Patient summary reviewed and Nursing notes reviewed   NPO Solid Status: > 8 hours  NPO Liquid Status: > 8 hours     Airway   Mallampati: II  TM distance: >3 FB  Neck ROM: full  no difficulty expected  Dental - normal exam     Pulmonary - normal exam   Cardiovascular   Exercise tolerance: good (4-7 METS)    Rhythm: regular  Rate: normal        Neuro/Psych  GI/Hepatic/Renal/Endo      Musculoskeletal     Abdominal    Substance History      OB/GYN          Other                                      Anesthesia Plan    ASA 2     general     intravenous induction   Anesthetic plan and risks discussed with patient.

## 2017-11-07 VITALS
SYSTOLIC BLOOD PRESSURE: 136 MMHG | WEIGHT: 168.43 LBS | RESPIRATION RATE: 18 BRPM | TEMPERATURE: 98.1 F | BODY MASS INDEX: 28.06 KG/M2 | HEART RATE: 71 BPM | OXYGEN SATURATION: 97 % | DIASTOLIC BLOOD PRESSURE: 74 MMHG | HEIGHT: 65 IN

## 2017-11-07 PROCEDURE — 25010000002 INFLUENZA VAC SPLIT QUAD 0.5 ML SUSPENSION PREFILLED SYRINGE: Performed by: OBSTETRICS & GYNECOLOGY

## 2017-11-07 PROCEDURE — G0008 ADMIN INFLUENZA VIRUS VAC: HCPCS | Performed by: OBSTETRICS & GYNECOLOGY

## 2017-11-07 PROCEDURE — 90686 IIV4 VACC NO PRSV 0.5 ML IM: CPT | Performed by: OBSTETRICS & GYNECOLOGY

## 2017-11-07 PROCEDURE — 25010000003 CEFAZOLIN IN DEXTROSE 2-4 GM/100ML-% SOLUTION: Performed by: OBSTETRICS & GYNECOLOGY

## 2017-11-07 RX ORDER — ACETAMINOPHEN 325 MG/1
650 TABLET ORAL EVERY 4 HOURS PRN
Start: 2017-11-07 | End: 2019-07-30

## 2017-11-07 RX ORDER — HYDROCODONE BITARTRATE AND ACETAMINOPHEN 5; 325 MG/1; MG/1
1 TABLET ORAL EVERY 6 HOURS PRN
Qty: 15 TABLET | Refills: 0 | Status: SHIPPED | OUTPATIENT
Start: 2017-11-07 | End: 2017-11-16

## 2017-11-07 RX ORDER — IBUPROFEN 400 MG/1
400 TABLET ORAL EVERY 6 HOURS PRN
Start: 2017-11-07 | End: 2019-07-30

## 2017-11-07 RX ADMIN — HYDROCODONE BITARTRATE AND ACETAMINOPHEN 1 TABLET: 10; 325 TABLET ORAL at 09:24

## 2017-11-07 RX ADMIN — DEXTROSE AND SODIUM CHLORIDE 100 ML/HR: 5; 450 INJECTION, SOLUTION INTRAVENOUS at 02:25

## 2017-11-07 RX ADMIN — INFLUENZA VIRUS VACCINE 0.5 ML: 15; 15; 15; 15 SUSPENSION INTRAMUSCULAR at 12:36

## 2017-11-07 RX ADMIN — HYDROCODONE BITARTRATE AND ACETAMINOPHEN 1 TABLET: 10; 325 TABLET ORAL at 14:15

## 2017-11-07 RX ADMIN — FUROSEMIDE 20 MG: 20 TABLET ORAL at 09:20

## 2017-11-07 RX ADMIN — ZOLPIDEM TARTRATE 5 MG: 5 TABLET, FILM COATED ORAL at 02:26

## 2017-11-07 RX ADMIN — CEFAZOLIN SODIUM 2 G: 2 INJECTION, SOLUTION INTRAVENOUS at 05:27

## 2017-11-07 NOTE — PLAN OF CARE
Problem: Patient Care Overview (Adult)  Goal: Plan of Care Review  Outcome: Ongoing (interventions implemented as appropriate)    11/06/17 1802 11/07/17 0405 11/07/17 0743   Coping/Psychosocial Response Interventions   Plan Of Care Reviewed With --  --  patient   Patient Care Overview   Progress progress toward functional goals as expected --  --    Outcome Evaluation   Outcome Summary/Follow up Plan --  VSS, pt slept little this shift, ready to go home, pain covered 1x --        Goal: Adult Individualization and Mutuality  Outcome: Ongoing (interventions implemented as appropriate)    11/07/17 0748   Individualization   Patient Specific Goals pain management       Goal: Discharge Needs Assessment  Outcome: Ongoing (interventions implemented as appropriate)    11/06/17 1216   Self-Care   Equipment Currently Used at Home none         Problem: Pain, Acute (Adult)  Intervention: Monitor/Manage Analgesia    11/06/17 2009 11/07/17 0743   Safety Interventions   Medication Review/Management medications reviewed --    Manage Acute Burn Pain   Pain Management Interventions --  pillow support       Intervention: Mutually Develop/Implement Acute Pain Management Plan    11/07/17 0743   Manage Acute Burn Pain   Pain Management Interventions pillow support       Intervention: Support/Optimize Psychosocial Response to Acute Pain    11/06/17 1744 11/07/17 0743   Coping Strategies   Trust Relationship/Rapport care explained;questions answered;emotional support provided --    Diversional Activities --  smartphone;television   Family/Support System Care support provided --    Supportive Measures --  active listening utilized         Goal: Acceptable Pain Control/Comfort Level  Outcome: Ongoing (interventions implemented as appropriate)    11/06/17 1803   Pain, Acute (Adult)   Acceptable Pain Control/Comfort Level making progress toward outcome

## 2017-11-07 NOTE — PROGRESS NOTES
[place]  : 1955  MRN: [id number]  CSN: [id number]    Post-operative Day #1  Subjective   Her pain is well controlled. She is passing gas. Her bowels are working normally.     Objective     Min/max vitals past 24 hours:   Temp  Min: 97.1 °F (36.2 °C)  Max: 98.6 °F (37 °C)  BP  Min: 112/56  Max: 143/82  Pulse  Min: 83  Max: 123  Resp  Min: 16  Max: 18        I/O last 3 completed shifts:  In: 2400 [I.V.:2400]  Out: 3025 [Urine:2900; Blood:125]    General: well developed; well nourished  no acute distress   Abdomen: soft, non-tender; no masses  no umbilical or inginual hernias are present  no hepato-splenomegaly   Pelvic: Not performed   Ext: Calves NT            Assessment   1. Post-op Day #1 S/P TVT and Anterior repair  2. She has not voided yet     Plan   1. Continue routine post-operative care   2. I will check later today - home is she is able to void properly    Ronnie Garcia MD  2017  8:30 AM

## 2017-11-07 NOTE — PROGRESS NOTES
Discharge Planning Assessment  [facility]     Patient Name: Shannan Vigil  MRN: [id number]  Today's Date: 11/7/2017    Admit Date: 11/6/2017          Discharge Needs Assessment       11/07/17 1027    Living Environment    Lives With child(elaine), adult    Living Arrangements house   2 story home    Home Accessibility bed and bath on same level    Number of Stairs to Enter Home 4    Number of Stairs Within Home 12    Stair Railings at Home inside, present on left side    Type of Financial/Environmental Concern none    Transportation Available car;family or friend will provide    Living Environment    Provides Primary Care For no one    Primary Care Provided By child(elaine) (specify)   supportive son and daughter in law staying with her    Quality Of Family Relationships supportive    Able to Return to Prior Living Arrangements yes    Discharge Needs Assessment    Concerns To Be Addressed no discharge needs identified    Readmission Within The Last 30 Days no previous admission in last 30 days    Anticipated Changes Related to Illness other (see comments)   post surgical recuperation    Equipment Currently Used at Home none    Equipment Needed After Discharge none    Discharge Disposition home or self-care    Discharge Contact Information if Applicable [phone]            Discharge Plan       11/07/17 1029    Case Management/Social Work Plan    Plan home    Patient/Family In Agreement With Plan yes    Additional Comments Anticipates discharge to home with no discharge planning needs identified.         Discharge Placement     No information found                Demographic Summary       11/07/17 1026    Referral Information    Referral Source admission list    Record Reviewed medical record    Contact Information    Permission Granted to Share Information With     Primary Care Physician Information    Name Julee Gant            Functional Status       11/07/17 1026    Functional Status Current     Ambulation 0-->independent    Transferring 0-->independent    Toileting 0-->independent    Bathing 0-->independent    Dressing 0-->independent    Eating 0-->independent    Communication 0-->understands/communicates without difficulty    Functional Status Prior    Ambulation 0-->independent    Transferring 0-->independent    Toileting 0-->independent    Bathing 0-->independent    Dressing 0-->independent    Eating 0-->independent    Communication 0-->understands/communicates without difficulty    IADL    Medications independent    Meal Preparation independent    Housekeeping independent    Laundry independent    Shopping independent    Oral Care independent    Activity Tolerance    Current Activity Limitations other (see comments)   post surgical restrictions    Usual Activity Tolerance excellent    Current Activity Tolerance excellent    Employment/Financial    Employment/Finance Comments Has [place] insurance with no known concerns            Psychosocial     None            Abuse/Neglect     None            Legal     None            Substance Abuse     None            Patient Forms     None          Ranjana Heller RN

## 2017-11-07 NOTE — DISCHARGE SUMMARY
Indy  Shannan Vigil  : 1955  MRN: [id number]  CSN: [id number]    Discharge Summary      Date of Admission: 2017   Date of Discharge:    Discharge Diagnosis: Stress urinary incontinence   Symptomatic cystocele    Procedures Performed: Procedure(s):  TENSION FREE VAGINAL TAPING EXACT WITH Anterior REPAIR, cystoscopy      Consults:    Brief History: Patient is a 62 y.o.who presented With symptomatic stress urinary incontinence.  She also had pressure in her vagina.  She had failed conservative medical management.  Kegel's were not effective.  Anti-cholinergics does not help or leakage.  She desired more definitive therapy.  We elected to proceed with Tension-free vaginal taping and anterior repair.     Hospital Course: She underwent the above surgery and tolerated it well.  Postoperatively she remained afebrile.  On postoperative day #1 she was able to void 301 150 mL's.  She felt like she is voiding well.  Apparently she said the nurse found 150 mL's after the 300 mL fibroid that they were on the sides and not in the midline.  She has a daughter-in-law who is a nurse that can catheterize her if need be.  I instructed her to void every 3 hours at the maximum.  If she feels like she cannot void or needs to and is unable to that she will need either intermittently self catheter herself or have her daughter-in-law do the catheterization.  She seemed to understand this.  She also not drink any liquids for 3 hours before she goes to sleep.     Pending Studies: None    Condition at discharge: gradually improving   Discharge Medications: Please see list, tried to delete Motrin and she has Mobic  and all were deleted.     Discharge Disposition: home   Follow-up: 10 days in my office sooner as needed          This note has been electronically signed.    Ronnie Garcia MD  2017

## 2017-11-07 NOTE — PLAN OF CARE
Problem: Patient Care Overview (Adult)  Goal: Plan of Care Review  Outcome: Ongoing (interventions implemented as appropriate)    11/06/17 1802 11/07/17 0405   Coping/Psychosocial Response Interventions   Plan Of Care Reviewed With patient --    Patient Care Overview   Progress progress toward functional goals as expected --    Outcome Evaluation   Outcome Summary/Follow up Plan --  VSS, pt slept little this shift, ready to go home, pain covered 1x       Goal: Adult Individualization and Mutuality  Outcome: Ongoing (interventions implemented as appropriate)  Goal: Discharge Needs Assessment  Outcome: Ongoing (interventions implemented as appropriate)    Problem: Perioperative Period (Adult)  Goal: Signs and Symptoms of Listed Potential Problems Will be Absent or Manageable (Perioperative Period)  Outcome: Ongoing (interventions implemented as appropriate)    Problem: Pain, Acute (Adult)  Goal: Acceptable Pain Control/Comfort Level  Outcome: Ongoing (interventions implemented as appropriate)

## 2017-11-22 ENCOUNTER — OFFICE VISIT (OUTPATIENT)
Dept: OBSTETRICS AND GYNECOLOGY | Facility: CLINIC | Age: 62
End: 2017-11-22

## 2017-11-22 VITALS
BODY MASS INDEX: 27.79 KG/M2 | SYSTOLIC BLOOD PRESSURE: 126 MMHG | RESPIRATION RATE: 16 BRPM | DIASTOLIC BLOOD PRESSURE: 80 MMHG | WEIGHT: 167 LBS

## 2017-11-22 DIAGNOSIS — Z09 SURGERY FOLLOW-UP: Primary | ICD-10-CM

## 2017-11-22 PROCEDURE — 99024 POSTOP FOLLOW-UP VISIT: CPT | Performed by: OBSTETRICS & GYNECOLOGY

## 2017-11-22 NOTE — PROGRESS NOTES
Subjective   Chief Complaint   Patient presents with   • Post-op     no problems     Shannan Vigil is a 62 y.o. year old  presenting to be seen for her post-operative visit.  She had a repair of cystocele and TVT.  Currently she reports no problems with eating, bowel movements, voiding, or wound drainage and pain is well controlled.  She has not had to catheter herself at all.  She has not leaked when she's coughed or sneezed.    The following portions of the patient's history were reviewed and updated as appropriate:problem list, current medications and allergies    Review of Systems no fever or discharge normal bowel movements normal urination   Objective   /80  Resp 16  Wt 167 lb (75.8 kg)  BMI 27.79 kg/m2    General:  well developed; well nourished  no acute distress  appears stated age   Abdomen: soft, non-tender; no masses  no umbilical or inginual hernias are present  no hepato-splenomegaly  incision is healed   Pelvis: Clinical staff was present for exam  External genitalia:  normal appearance of the external genitalia including Bartholin's and Linda's glands.  :  urethral meatus normal; urethral hypermobility is absent.  Vaginal:  atrophic mucosal changes are present; Probable suture line appears intact no significant descent  Uterus:  absent.  Adnexa:  non palpable bilaterally.  Rectal:  digital rectal exam not performed; anus visually normal appearing.          Assessment   1. Doing well status post anterior repair and tension-free vaginal taping     Plan   1. We'll see back in 4 weeks no heavy lifting or intercourse in that period of time    Medications Rx this encounter:  No orders of the defined types were placed in this encounter.         This note was electronically signed.    Ronnie Garcia MD  2017

## 2019-01-16 RX ORDER — IBANDRONATE SODIUM 150 MG/1
TABLET, FILM COATED ORAL
Qty: 3 TABLET | Refills: 0 | Status: SHIPPED | OUTPATIENT
Start: 2019-01-16 | End: 2019-07-30 | Stop reason: SDUPTHER

## 2019-04-22 RX ORDER — IBANDRONATE SODIUM 150 MG/1
TABLET, FILM COATED ORAL
Qty: 3 TABLET | Refills: 0 | OUTPATIENT
Start: 2019-04-22

## 2019-07-30 ENCOUNTER — OFFICE VISIT (OUTPATIENT)
Dept: OBSTETRICS AND GYNECOLOGY | Facility: CLINIC | Age: 64
End: 2019-07-30

## 2019-07-30 VITALS
WEIGHT: 170 LBS | SYSTOLIC BLOOD PRESSURE: 124 MMHG | HEIGHT: 65 IN | BODY MASS INDEX: 28.32 KG/M2 | DIASTOLIC BLOOD PRESSURE: 80 MMHG

## 2019-07-30 DIAGNOSIS — Z13.820 SCREENING FOR OSTEOPOROSIS: ICD-10-CM

## 2019-07-30 DIAGNOSIS — Z12.39 SCREENING FOR BREAST CANCER: ICD-10-CM

## 2019-07-30 DIAGNOSIS — N39.46 MIXED INCONTINENCE: Primary | ICD-10-CM

## 2019-07-30 DIAGNOSIS — M85.80 OSTEOPENIA, UNSPECIFIED LOCATION: ICD-10-CM

## 2019-07-30 DIAGNOSIS — Z12.11 SCREENING FOR COLON CANCER: ICD-10-CM

## 2019-07-30 PROBLEM — K63.5 HYPERPLASTIC COLONIC POLYP: Status: ACTIVE | Noted: 2019-07-30

## 2019-07-30 PROBLEM — N39.3 STRESS INCONTINENCE IN FEMALE: Status: RESOLVED | Noted: 2017-11-06 | Resolved: 2019-07-30

## 2019-07-30 PROCEDURE — 99396 PREV VISIT EST AGE 40-64: CPT | Performed by: OBSTETRICS & GYNECOLOGY

## 2019-07-30 RX ORDER — IBANDRONATE SODIUM 150 MG/1
150 TABLET, FILM COATED ORAL
Qty: 3 TABLET | Refills: 3 | Status: SHIPPED | OUTPATIENT
Start: 2019-07-30 | End: 2020-07-24

## 2019-07-30 RX ORDER — MELOXICAM 15 MG/1
15 TABLET ORAL DAILY
COMMUNITY

## 2019-07-30 NOTE — PROGRESS NOTES
DataSubjective   Chief Complaint   Patient presents with   • Annual Exam     Shannan Vigil is a 63 y.o. year old  who is post-menopausal.  She is S/P hysterectomy presenting to be seen for her annual exam.  This past year she has been on hormone replacement therapy.  There has not been vaginal bleeding in the last 12 months.  Menopausal symptoms are not present.  She has not been seen since 2 weeks postop TVT and anterior repair.  Is doing very well no urinary problems or leakage.  Very happy with the results realize she is out of [facility] and that is where she came in.  Discussed getting her caught up-to-date with DEXA scan mammogram and colonoscopy.    SEXUAL Hx:  She is currently sexually active.   74 yo boyfriend   [place] is painful: no              She has concerns about domestic violence no    HEALTH Hx:  Level of weekly physical activity: 2.5 hours  She wears her seat belt: yes.  Self breast awareness: yes  Calcium servings per day: 2  Caffeine intake:2 equivalent to a cup of coffee  Social History     Substance and Sexual Activity   Alcohol Use Yes   • Alcohol/week: 3.0 - 3.6 oz   • Types: 5 - 6 Shots of liquor per week   • Frequency: 4 or more times a week   • Binge frequency: Weekly    Comment: ~12 weekends                  Social History    Tobacco Use      Smoking status: Never Smoker      Smokeless tobacco: Never Used      The following portions of the patient's history were reviewed and updated as appropriate:She  has a past medical history of Anxiety, Arthritis, Endometriosis, GERD (gastroesophageal reflux disease), Hyperlipidemia, Hypertension, Osteopenia, Wears glasses, and Wears partial dentures.  She does not have any pertinent problems on file.  She  has a past surgical history that includes Total hip arthroplasty (Right, 2017); Mid-Urethral Sling (N/A, 2017); Total abdominal hysterectomy w/ bilateral salpingoophorectomy (Right, ); Colonoscopy w/ polypectomy (); and  "Ectopic pregnancy (Left, 1993).  Her family history includes Arthritis in her other; Uterine cancer in her mother.  She  reports that she has never smoked. She has never used smokeless tobacco. She reports that she drinks about 3.0 - 3.6 oz of alcohol per week. She reports that she does not use drugs.  She is allergic to atorvastatin.    Current Outpatient Medications:   •  amLODIPine (NORVASC) 2.5 MG tablet, Take 2.5 mg by mouth Every Night., Disp: , Rfl:   •  Biotin 1 MG capsule, Take 1 capsule by mouth Daily., Disp: , Rfl:   •  furosemide (LASIX) 20 MG tablet, Take 20 mg by mouth Daily., Disp: , Rfl:   •  hydrOXYzine (ATARAX) 25 MG tablet, Take 25 mg by mouth 3 (Three) Times a Day As Needed for Anxiety., Disp: , Rfl:   •  lisinopril (PRINIVIL,ZESTRIL) 2.5 MG tablet, Take 2.5 mg by mouth Every Night., Disp: , Rfl:   •  meloxicam (MOBIC) 15 MG tablet, Take 15 mg by mouth Daily., Disp: , Rfl:   •  omeprazole (priLOSEC) 40 MG capsule, Take 40 mg by mouth Daily., Disp: , Rfl:   •  Potassium 99 MG tablet, Take 1 tablet by mouth Daily., Disp: , Rfl:   •  TESTOSTERONE IM, Inject 1 mL into the shoulder, thigh, or buttocks Every 30 (Thirty) Days., Disp: , Rfl:   •  ibandronate (BONIVA) 150 MG tablet, Take 1 tablet by mouth Every 30 (Thirty) Days., Disp: 3 tablet, Rfl: 3    Review of Systems  Constitutional POS: nothing reported    NEG: anorexia, malaise or night sweats   Genitourinary POS: nothing reported    NEG: dysuria, frequency or hematuria   Gastointestinal POS: constipation (chronic) and if on calcium    NEG: bloating, change in bowel habits, melena or reflux symptoms   Integument POS: sun tanned    NEG: moles that are changing in size, shape, color, itching or rashes   Breast POS: nothing reported    NEG: persistent breast lump, skin dimpling, breast tenderness or nipple discharge        Objective   /80   Ht 165.1 cm (65\")   Wt 77.1 kg (170 lb)   Breastfeeding? No   BMI 28.29 kg/m²     General:  well " developed; well nourished  no acute distress  appears stated age   Skin:  No suspicious lesions seen   Thyroid: not examined   Breasts:  Examined in supine position  Symmetric without masses or skin dimpling  Nipples normal without inversion, lesions or discharge  Fibrocystic changes are present both breasts without a discrete mass   Abdomen: soft, non-tender; no masses  no umbilical or inguinal hernias are present  no hepato-splenomegaly   Pelvis: Clinical staff was present for exam  External genitalia:  normal appearance of the external genitalia including Bartholin's and Loraine's glands.  :  urethral meatus normal;  Vaginal:  atrophic mucosal changes are present; Slight cystocele rectocele  Uterus:  absent.  Adnexa:  non palpable bilaterally.       Lab and Imaging Review   CBC, CMP, LIPIDS, Pap test, PATHOLOGY  , TSH and Vitamin D  DEXA  Mammogram report       Assessment   1. Normal post cyst postmenopausal examination.  She is using testosterone for libido purposes.  I do not see any lab work since about 2011.  2. Gynecologic, breast and colorectal screening protocols were reviewed.  We will schedule appropriate test.  Looks like she had a hyperplastic polyp 2011  3. Osteopenia.  She is run out of Boniva needs a prescription       Plan      1. The importance of keeping all planned follow-up and taking all medications as prescribed was emphasized.  2. Self breast awareness and mammogram protocols discussed.  3. Regular exercise and calcium ( ideally dietary) discussed  4. Follow up for annual exam or PRN     New Medications Ordered This Visit   Medications   • ibandronate (BONIVA) 150 MG tablet     Sig: Take 1 tablet by mouth Every 30 (Thirty) Days.     Dispense:  3 tablet     Refill:  3     Past due for annual exam, please make appt     Orders Placed This Encounter   Procedures   • Mammo Screening Digital Tomosynthesis Bilateral With CAD     2011 Results in Epic/ media secton     Standing Status:   Future      Standing Expiration Date:   7/30/2020     Order Specific Question:   Reason for Exam:     Answer:   osteopenia 2011 [facility]   • DEXA Bone Density Axial     Standing Status:   Future     Standing Expiration Date:   7/30/2020     Order Specific Question:   Reason for Exam:     Answer:   Post-menopausal   • Ambulatory Referral For Screening Colonoscopy     Referral Priority:   Routine     Referral Type:   Diagnostic Medical     Referral Reason:   Specialty Services Required     Referred to Provider:   Dariel Bergman MD     Number of Visits Requested:   1          This note was electronically signed.    Ronnie Garcia MD  July 30, 2019    Note: Speech recognition transcription software may have been used to create portions of this document.  An attempt at proofreading has been made but errors in transcription could still be present.

## 2019-08-07 DIAGNOSIS — Z12.11 SCREENING FOR COLON CANCER: Primary | ICD-10-CM

## 2019-08-07 RX ORDER — SODIUM, POTASSIUM,MAG SULFATES 17.5-3.13G
1 SOLUTION, RECONSTITUTED, ORAL ORAL TAKE AS DIRECTED
Qty: 2 BOTTLE | Refills: 0 | Status: SHIPPED | OUTPATIENT
Start: 2019-08-07 | End: 2019-08-12 | Stop reason: SDUPTHER

## 2019-08-12 ENCOUNTER — TELEPHONE (OUTPATIENT)
Dept: GASTROENTEROLOGY | Facility: CLINIC | Age: 64
End: 2019-08-12

## 2019-08-12 DIAGNOSIS — Z12.11 SCREENING FOR COLON CANCER: ICD-10-CM

## 2019-08-12 RX ORDER — SODIUM, POTASSIUM,MAG SULFATES 17.5-3.13G
1 SOLUTION, RECONSTITUTED, ORAL ORAL TAKE AS DIRECTED
Qty: 2 BOTTLE | Refills: 0 | Status: SHIPPED | OUTPATIENT
Start: 2019-08-12 | End: 2020-04-09

## 2019-08-12 NOTE — TELEPHONE ENCOUNTER
Patient called back. She agreed to call [facility] pharmacy to have bowel prep delivered to her house.

## 2019-08-12 NOTE — TELEPHONE ENCOUNTER
Called patient back to inform her that bowel prep has been sent to [facility] pharmacy. Called pharmacy 3 times to have them to delivery medication to patient. Line was busy all  3 times. I will try one more time to have medication to be delivered. No answer; left voice message.

## 2019-08-13 ENCOUNTER — LAB REQUISITION (OUTPATIENT)
Dept: LAB | Facility: HOSPITAL | Age: 64
End: 2019-08-13

## 2019-08-13 ENCOUNTER — OUTSIDE FACILITY SERVICE (OUTPATIENT)
Dept: GASTROENTEROLOGY | Facility: CLINIC | Age: 64
End: 2019-08-13

## 2019-08-13 DIAGNOSIS — Z12.11 ENCOUNTER FOR SCREENING FOR MALIGNANT NEOPLASM OF COLON: ICD-10-CM

## 2019-08-13 PROCEDURE — 45385 COLONOSCOPY W/LESION REMOVAL: CPT | Performed by: INTERNAL MEDICINE

## 2019-08-13 PROCEDURE — 88305 TISSUE EXAM BY PATHOLOGIST: CPT | Performed by: INTERNAL MEDICINE

## 2019-08-14 LAB
CYTO UR: NORMAL
LAB AP CASE REPORT: NORMAL
LAB AP CLINICAL INFORMATION: NORMAL
PATH REPORT.FINAL DX SPEC: NORMAL
PATH REPORT.GROSS SPEC: NORMAL

## 2019-08-22 ENCOUNTER — TELEPHONE (OUTPATIENT)
Dept: GASTROENTEROLOGY | Facility: CLINIC | Age: 64
End: 2019-08-22

## 2019-08-22 NOTE — TELEPHONE ENCOUNTER
----- Message from Dariel Bergman MD sent at 8/16/2019  3:51 PM EDT -----  Let Ms. Vigil know there was one adenoma type polyp.  She will need a repeat colonoscopy in 5 years.  Thank you,  CALVIN

## 2019-10-09 ENCOUNTER — HOSPITAL ENCOUNTER (OUTPATIENT)
Dept: BONE DENSITY | Facility: HOSPITAL | Age: 64
Discharge: HOME | End: 2019-10-09
Admitting: OBSTETRICS & GYNECOLOGY

## 2019-10-09 ENCOUNTER — HOSPITAL ENCOUNTER (OUTPATIENT)
Dept: MAMMOGRAPHY | Facility: HOSPITAL | Age: 64
Discharge: HOME | End: 2019-10-09

## 2019-10-09 DIAGNOSIS — Z12.39 SCREENING FOR BREAST CANCER: ICD-10-CM

## 2019-10-09 DIAGNOSIS — Z13.820 SCREENING FOR OSTEOPOROSIS: ICD-10-CM

## 2019-10-09 PROCEDURE — 77067 SCR MAMMO BI INCL CAD: CPT | Performed by: RADIOLOGY

## 2019-10-09 PROCEDURE — 77067 SCR MAMMO BI INCL CAD: CPT

## 2019-10-09 PROCEDURE — 77063 BREAST TOMOSYNTHESIS BI: CPT

## 2019-10-09 PROCEDURE — 77063 BREAST TOMOSYNTHESIS BI: CPT | Performed by: RADIOLOGY

## 2019-10-09 PROCEDURE — 77080 DXA BONE DENSITY AXIAL: CPT

## 2020-04-09 ENCOUNTER — OFFICE VISIT (OUTPATIENT)
Dept: OBSTETRICS AND GYNECOLOGY | Facility: CLINIC | Age: 65
End: 2020-04-09

## 2020-04-09 VITALS — BODY MASS INDEX: 28.29 KG/M2 | RESPIRATION RATE: 16 BRPM | WEIGHT: 170 LBS

## 2020-04-09 DIAGNOSIS — M85.852 OSTEOPENIA OF NECK OF LEFT FEMUR: ICD-10-CM

## 2020-04-09 DIAGNOSIS — K21.9 GASTROESOPHAGEAL REFLUX DISEASE WITHOUT ESOPHAGITIS: Primary | ICD-10-CM

## 2020-04-09 PROCEDURE — 99441 PR PHYS/QHP TELEPHONE EVALUATION 5-10 MIN: CPT | Performed by: OBSTETRICS & GYNECOLOGY

## 2020-04-09 NOTE — PROGRESS NOTES
Subjective   Chief Complaint   Patient presents with   • Follow-up     medication     Shannan Vigil is a 64 y.o. year old .  No LMP recorded. Patient has had a hysterectomy.  She presents for a telephone encounter to discuss her use of bisphosphonates along with her history of reflux disease.  She is currently on Prilosec for her reflux.  She takes Boniva monthly.  She does not recall any problems.  [facility] sent me a warning letter.  When I reviewed her bone density studies from  in 2016 they were stable so I was considering discontinuation of the Boniva were given her drug holiday.  However she does not seem to have any problems.  She denies any esophagitis or Gutiérrez's esophagus or upper problems only some heartburn.  She says the Prilosec works very well.    Regarding her osteopenia she does stay active and exercise working in the yard some today.  Does not smoke cigarettes drinks alcohol on the weekends when she is on her house boat.    She did have a higher than normal vitamin D level on her primary care's have her back off on the supplements.    Regarding calcium she is taking some sort of Marine collagen but it does not have the amount of calcium listed I have added just an over-the-counter 200 mg Citracal in place of that unknown amount.  She says she eats cottage cheese once a day no milk because she does not like it greens usually about every other day caught her fish 4 times a week.  Some yogurt not a lot of knots.  I discussed increasing dietary calcium because she gets constipated on supplementation.                      The following portions of the patient's history were reviewed and updated as appropriate:problem list, current medications, allergies and past surgical history      Current Outpatient Medications:   •  amLODIPine (NORVASC) 2.5 MG tablet, Take 2.5 mg by mouth Every Night., Disp: , Rfl:   •  Biotin 1 MG capsule, Take 1 capsule by mouth Daily., Disp: , Rfl:   •  calcium  citrate-vitamin d (CITRACAL) 200-250 MG-UNIT tablet tablet, Take 1 tablet by mouth Daily., Disp: , Rfl:   •  furosemide (LASIX) 20 MG tablet, Take 20 mg by mouth Daily., Disp: , Rfl:   •  hydrOXYzine (ATARAX) 25 MG tablet, Take 25 mg by mouth 3 (Three) Times a Day As Needed for Anxiety., Disp: , Rfl:   •  ibandronate (BONIVA) 150 MG tablet, Take 1 tablet by mouth Every 30 (Thirty) Days., Disp: 3 tablet, Rfl: 3  •  lisinopril (PRINIVIL,ZESTRIL) 2.5 MG tablet, Take 2.5 mg by mouth Every Night., Disp: , Rfl:   •  meloxicam (MOBIC) 15 MG tablet, Take 15 mg by mouth Daily., Disp: , Rfl:   •  omeprazole (priLOSEC) 40 MG capsule, Take 40 mg by mouth Daily., Disp: , Rfl:   •  Potassium 99 MG tablet, Take 1 tablet by mouth Daily., Disp: , Rfl:   •  TESTOSTERONE IM, Inject 1 mL into the shoulder, thigh, or buttocks Every 30 (Thirty) Days., Disp: , Rfl:     no or minimal alcohol; non-smoker    Review of Systems no cough cold fever normal bladder bowels     Objective       General:  no acute distress   Skin:  Not performed.   Thyroid: not examined   Lungs:  Not performed.  breathing is unlabored   Heart:  Not performed.   Abdomen: Not performed.   Pelvis: Not performed.     Lab Review   PATHOLOGY      Imaging   DEXA       Assessment   1. Osteopenia stable from [id number]  2. GERD with no esophagitis tolerating Boniva once a month without any issues.  Discussed taking that on an empty stomach       Plan   1.  Continue current medications if she has any issues she will let me know  Calcium and exercise discussed.  See her back in August for her annual examination sooner should she have any problems.          No orders of the defined types were placed in this encounter.    No orders of the defined types were placed in this encounter.             This note was electronically signed.  I spent 9 minutes with this patient on telephone encounter.    Ronnie Garcia MD  April 9, 2020

## 2020-07-24 RX ORDER — IBANDRONATE SODIUM 150 MG/1
TABLET, FILM COATED ORAL
Qty: 3 TABLET | Refills: 0 | Status: SHIPPED | OUTPATIENT
Start: 2020-07-24 | End: 2020-08-04 | Stop reason: SDUPTHER

## 2020-08-04 ENCOUNTER — OFFICE VISIT (OUTPATIENT)
Dept: OBSTETRICS AND GYNECOLOGY | Facility: CLINIC | Age: 65
End: 2020-08-04

## 2020-08-04 VITALS
HEIGHT: 65 IN | BODY MASS INDEX: 28.66 KG/M2 | WEIGHT: 172 LBS | DIASTOLIC BLOOD PRESSURE: 70 MMHG | SYSTOLIC BLOOD PRESSURE: 118 MMHG

## 2020-08-04 DIAGNOSIS — F52.0 HYPOACTIVE SEXUAL DESIRE: ICD-10-CM

## 2020-08-04 DIAGNOSIS — N81.10 CYSTOCELE WITHOUT UTERINE PROLAPSE: ICD-10-CM

## 2020-08-04 DIAGNOSIS — Z01.411 ENCOUNTER FOR GYNECOLOGICAL EXAMINATION WITH ABNORMAL FINDING: Primary | ICD-10-CM

## 2020-08-04 DIAGNOSIS — M85.852 OSTEOPENIA OF NECK OF LEFT FEMUR: ICD-10-CM

## 2020-08-04 PROBLEM — K63.5 HYPERPLASTIC COLONIC POLYP: Status: RESOLVED | Noted: 2019-07-30 | Resolved: 2020-08-04

## 2020-08-04 PROCEDURE — 99396 PREV VISIT EST AGE 40-64: CPT | Performed by: OBSTETRICS & GYNECOLOGY

## 2020-08-04 RX ORDER — IBANDRONATE SODIUM 150 MG/1
150 TABLET, FILM COATED ORAL
Qty: 3 TABLET | Refills: 1 | Status: SHIPPED | OUTPATIENT
Start: 2020-08-04 | End: 2021-02-04 | Stop reason: SDUPTHER

## 2020-08-04 NOTE — PROGRESS NOTES
DataSubjective   Chief Complaint   Patient presents with   • Annual Exam     Shannan Vigil is a 64 y.o. year old  who is post-menopausal.  She is S/P hysterectomy presenting to be seen for her annual exam.  This past year she has not been on hormone replacement therapy.  There has not been vaginal bleeding in the last 12 months.  Menopausal symptoms are not present.  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  I discussed that it is not FDA approved and could potentially cause some liver issues will need to check liver enzymes she reports that she gets her blood work from primary care in September.  She also would like spironolactone a friend of hers uses this for skin conditions wants to know if she can replace her blood pressure medication with it.  I told her I would not do that because it is a very weak diuretic.  Lasix 20 mg and Norvasc should not be replaced with spironolactone.    SEXUAL Hx:  She is currently sexually active.    [place] is painful: no              She has concerns about domestic violence no    HEALTH Hx:  Level of weekly physical activity: 2 hours  She wears her seat belt: yes.  Self breast awareness: yes  Calcium servings per day: 2  Caffeine intake:2 equivalent to a cup of coffee  Social History     Substance and Sexual Activity   Alcohol Use Yes   • Alcohol/week: 6.0 - 7.0 standard drinks   • Types: 1 Cans of beer, 5 - 6 Shots of liquor per week   • Frequency: 4 or more times a week   • Drinks per session: 3 or 4   • Binge frequency: Weekly    Comment: ~12 weekends /seltzer discussed limiting alcohol intake to 10 or less per week                 Social History    Tobacco Use      Smoking status: Never Smoker      Smokeless tobacco: Never Used      The following portions of the patient's history were reviewed and updated as appropriate:problem list, current medications, allergies, past  "family history, past medical history, past social history and past surgical history    Current Outpatient Medications:   •  amLODIPine (NORVASC) 2.5 MG tablet, Take 2.5 mg by mouth Every Night., Disp: , Rfl:   •  Biotin 1 MG capsule, Take 1 capsule by mouth Daily., Disp: , Rfl:   •  calcium citrate-vitamin d (CITRACAL) 200-250 MG-UNIT tablet tablet, Take 1 tablet by mouth Daily., Disp: , Rfl:   •  furosemide (LASIX) 20 MG tablet, Take 20 mg by mouth Daily., Disp: , Rfl:   •  hydrOXYzine (ATARAX) 25 MG tablet, Take 25 mg by mouth 3 (Three) Times a Day As Needed for Anxiety., Disp: , Rfl:   •  ibandronate (BONIVA) 150 MG tablet, Take 1 tablet by mouth Every 30 (Thirty) Days., Disp: 3 tablet, Rfl: 1  •  lisinopril (PRINIVIL,ZESTRIL) 2.5 MG tablet, Take 2.5 mg by mouth Every Night., Disp: , Rfl:   •  meloxicam (MOBIC) 15 MG tablet, Take 15 mg by mouth Daily., Disp: , Rfl:   •  omeprazole (priLOSEC) 40 MG capsule, Take 40 mg by mouth Daily., Disp: , Rfl:   •  Potassium 99 MG tablet, Take 1 tablet by mouth Daily., Disp: , Rfl:   •  TESTOSTERONE IM, Inject 1 mL into the shoulder, thigh, or buttocks Every 30 (Thirty) Days., Disp: , Rfl:     Review of Systems  Constitutional POS: nothing reported    NEG: anorexia, fevers, malaise or night sweats   Genitourinary POS: nothing reported    NEG: dysuria, frequency or hematuria   Gastointestinal POS: nothing reported    NEG: bloating, change in bowel habits, melena or reflux symptoms   Breast                       POS: nothing reported  NEG: persistent breast lump, skin dimpling, breast tenderness or nipple discharge                    Objective   /70   Ht 165.1 cm (65\")   Wt 78 kg (172 lb)   Breastfeeding No   BMI 28.62 kg/m²     General:  well developed; well nourished  no acute distress  appears stated age   Skin:  No suspicious lesions seen   Thyroid: not examined   Breasts:  Examined in supine position  Symmetric without masses or skin dimpling  Nipples normal " without inversion, lesions or discharge  Fibrocystic changes are present both breasts without a discrete mass   Abdomen: soft, non-tender; no masses  no umbilical or inguinal hernias are present  no hepato-splenomegaly   Pelvis: Clinical staff was present for exam  External genitalia:  normal appearance of the external genitalia including Bartholin's and Borden's glands.  :  urethral meatus normal; urethral hypermobility is absent.  Vaginal:  atrophic mucosal changes are present;  Uterus:  absent.  Adnexa:  non palpable bilaterally.  Rectal:  digital rectal exam not performed; anus visually normal appearing.   Minimal cystocele       Lab and Imaging Review   CBC, PATHOLOGY   and Recent testosterone level  DEXA  Mammogram report       Assessment     1. postmenopausal examination with hypoactive sexual desire.  Patient feels she reports she feels a lot better on testosterone supplementation.  Primary care doctors retired used to give this to her.  She would take 1 mg once a month.  Discussed risk of potential liver damage with this formulation.  2. Gynecologic, breast and colorectal screening protocols were reviewed.  She has a history of polyps and is up-to-date on mammograms  3. Osteopenia with an adequate calcium  4. Carlo spironolactone however that could cause hyperkalemia given her lisinopril so I will declined that medication.       Plan     1. The importance of keeping all planned follow-up and taking all medications as prescribed was emphasized.  2. Self breast awareness and mammogram protocols discussed.  3. Regular exercise and calcium ( ideally dietary) discussed  4. Follow up in 6 months or as needed  5. I will have Kalie send this into the [facility] pharmacy at 1 mg/mL for 6 months only I will need to see liver function testing.    New Medications Ordered This Visit   Medications   • ibandronate (BONIVA) 150 MG tablet     Sig: Take 1 tablet by mouth Every 30 (Thirty) Days.     Dispense:  3 tablet      Refill:  1     No orders of the defined types were placed in this encounter.           This note was electronically signed.    Ronnie Garcia MD  August 4, 2020    Note: Speech recognition transcription software may have been used to create portions of this document.  An attempt at proofreading has been made but errors in transcription could still be present.

## 2020-10-22 RX ORDER — IBANDRONATE SODIUM 150 MG/1
TABLET, FILM COATED ORAL
Qty: 3 TABLET | Refills: 0 | OUTPATIENT
Start: 2020-10-22

## 2021-02-04 ENCOUNTER — LAB (OUTPATIENT)
Dept: LAB | Facility: HOSPITAL | Age: 66
End: 2021-02-04

## 2021-02-04 ENCOUNTER — OFFICE VISIT (OUTPATIENT)
Dept: OBSTETRICS AND GYNECOLOGY | Facility: CLINIC | Age: 66
End: 2021-02-04

## 2021-02-04 VITALS
BODY MASS INDEX: 29.45 KG/M2 | WEIGHT: 177 LBS | DIASTOLIC BLOOD PRESSURE: 70 MMHG | RESPIRATION RATE: 16 BRPM | SYSTOLIC BLOOD PRESSURE: 118 MMHG

## 2021-02-04 DIAGNOSIS — F52.0 HYPOACTIVE SEXUAL DESIRE: Primary | ICD-10-CM

## 2021-02-04 DIAGNOSIS — N95.1 MENOPAUSAL SYMPTOMS: ICD-10-CM

## 2021-02-04 DIAGNOSIS — M85.852 OSTEOPENIA OF NECK OF LEFT FEMUR: ICD-10-CM

## 2021-02-04 DIAGNOSIS — Z12.31 ENCOUNTER FOR SCREENING MAMMOGRAM FOR MALIGNANT NEOPLASM OF BREAST: ICD-10-CM

## 2021-02-04 DIAGNOSIS — I10 ESSENTIAL HYPERTENSION: ICD-10-CM

## 2021-02-04 LAB
ALBUMIN SERPL-MCNC: 4.5 G/DL (ref 3.5–5.2)
ALBUMIN/GLOB SERPL: 1.5 G/DL
ALP SERPL-CCNC: 75 U/L (ref 39–117)
ALT SERPL W P-5'-P-CCNC: 39 U/L (ref 1–33)
ANION GAP SERPL CALCULATED.3IONS-SCNC: 12 MMOL/L (ref 5–15)
AST SERPL-CCNC: 33 U/L (ref 1–32)
BILIRUB SERPL-MCNC: 0.2 MG/DL (ref 0–1.2)
BUN SERPL-MCNC: 16 MG/DL (ref 8–23)
BUN/CREAT SERPL: 21.1 (ref 7–25)
CALCIUM SPEC-SCNC: 9.6 MG/DL (ref 8.6–10.5)
CHLORIDE SERPL-SCNC: 98 MMOL/L (ref 98–107)
CO2 SERPL-SCNC: 27 MMOL/L (ref 22–29)
CREAT SERPL-MCNC: 0.76 MG/DL (ref 0.57–1)
ESTRADIOL SERPL HS-MCNC: 42 PG/ML
GFR SERPL CREATININE-BSD FRML MDRD: 77 ML/MIN/1.73
GLOBULIN UR ELPH-MCNC: 3.1 GM/DL
GLUCOSE SERPL-MCNC: 80 MG/DL (ref 65–99)
POTASSIUM SERPL-SCNC: 4.2 MMOL/L (ref 3.5–5.2)
PROT SERPL-MCNC: 7.6 G/DL (ref 6–8.5)
SODIUM SERPL-SCNC: 137 MMOL/L (ref 136–145)

## 2021-02-04 PROCEDURE — 82670 ASSAY OF TOTAL ESTRADIOL: CPT | Performed by: OBSTETRICS & GYNECOLOGY

## 2021-02-04 PROCEDURE — 99213 OFFICE O/P EST LOW 20 MIN: CPT | Performed by: OBSTETRICS & GYNECOLOGY

## 2021-02-04 PROCEDURE — 36415 COLL VENOUS BLD VENIPUNCTURE: CPT | Performed by: OBSTETRICS & GYNECOLOGY

## 2021-02-04 PROCEDURE — 80053 COMPREHEN METABOLIC PANEL: CPT | Performed by: OBSTETRICS & GYNECOLOGY

## 2021-02-04 PROCEDURE — 84402 ASSAY OF FREE TESTOSTERONE: CPT | Performed by: OBSTETRICS & GYNECOLOGY

## 2021-02-04 RX ORDER — IBANDRONATE SODIUM 150 MG/1
150 TABLET, FILM COATED ORAL
Qty: 3 TABLET | Refills: 1 | Status: SHIPPED | OUTPATIENT
Start: 2021-02-04 | End: 2021-08-04

## 2021-02-04 RX ORDER — MELATONIN
1000 DAILY
COMMUNITY
End: 2021-08-10

## 2021-02-04 NOTE — PROGRESS NOTES
Subjective   Chief Complaint   Patient presents with   • Follow-up     6 month f/u on medications     Shannan Vigil is a 65 y.o. year old .  No LMP recorded. Patient has had a hysterectomy.  She presents to be seen because of follow up on trestosterone she is feels very well.  Libido is improved  She is currently dating a regional rep for [employer].  She rides motorcycles so that is how they met.  Overall she feels good she has gained some weight during the pandemic.  May be eating too much and cooking too much in the wintertime  She is getting estrogen pellets elsewhere had these in November.  I will check estrogen and testosterone along with liver functions her CMP.  She is also hypertensive.  I have asked her to check with Gabriela Cannon for checking thyroid cholesterol etc.  She also needs refill on Boniva she has been even getting extra calcium in her diet and staying active as possible takes vitamin D supplementation along with vitamin B                    The following portions of the patient's history were reviewed and updated as appropriate:problem list, current medications, allergies and past medical history      Current Outpatient Medications:   •  amLODIPine (NORVASC) 2.5 MG tablet, Take 2.5 mg by mouth Every Night., Disp: , Rfl:   •  Biotin 1 MG capsule, Take 1 capsule by mouth Daily., Disp: , Rfl:   •  calcium citrate-vitamin d (CITRACAL) 200-250 MG-UNIT tablet tablet, Take 1 tablet by mouth Daily., Disp: , Rfl:   •  cholecalciferol (VITAMIN D3) 25 MCG (1000 UT) tablet, Take 1,000 Units by mouth Daily., Disp: , Rfl:   •  furosemide (LASIX) 20 MG tablet, Take 20 mg by mouth Daily., Disp: , Rfl:   •  hydrOXYzine (ATARAX) 25 MG tablet, Take 25 mg by mouth 3 (Three) Times a Day As Needed for Anxiety., Disp: , Rfl:   •  ibandronate (BONIVA) 150 MG tablet, Take 1 tablet by mouth Every 30 (Thirty) Days., Disp: 3 tablet, Rfl: 1  •  lisinopril (PRINIVIL,ZESTRIL) 2.5 MG tablet, Take 2.5 mg by  mouth Every Night., Disp: , Rfl:   •  meloxicam (MOBIC) 15 MG tablet, Take 15 mg by mouth Daily., Disp: , Rfl:   •  omeprazole (priLOSEC) 40 MG capsule, Take 40 mg by mouth Daily., Disp: , Rfl:   •  Potassium 99 MG tablet, Take 1 tablet by mouth Daily., Disp: , Rfl:   •  TESTOSTERONE IM, Inject 1 mL into the shoulder, thigh, or buttocks Every 30 (Thirty) Days., Disp: , Rfl:     alcohol intake is 4-6 on weekends on her house boat, nonsmoker, caffeine use is moderate-to-high daily    Review of Systems no change cough cold normal bladder bowels.           Objective   /70   Resp 16   Wt 80.3 kg (177 lb)   Breastfeeding No   BMI 29.45 kg/m²      General:  well developed; well nourished  no acute distress  appears stated age   Skin:  No suspicious lesions seen   Thyroid: not examined   Lungs:  breathing is unlabored   Heart:  Not performed.   Abdomen: soft, non-tender; no masses  no umbilical or inguinal hernias are present  no hepato-splenomegaly   Pelvis: Not performed.     Lab Review   testosterone    Imaging   DEXA  Mammogram report              Assessment     1. Menopausal symptoms hypoactive sexual desire improved on testosterone 1 mL monthly we will check labs to be sure liver enzymes are okay\  2. Osteopenia bone density study discussed exercise calcium vitamin D and continuation of Boniva monthly through [facility]  3. Hypertension controlled  4. Needs mammogram  5. Colonoscopy up-to-date adenoma 2019         Plan     1. Check lab work as below.  Suggest cholesterol etc. through primary care  2. Order mammogram  3. Testosterone through [facility] pharmacy  4. Follow-up 6 months        Orders Placed This Encounter   Procedures   • Mammo Screening Digital Tomosynthesis Bilateral With CAD     Standing Status:   Future     Standing Expiration Date:   2/4/2022     Order Specific Question:   Reason for Exam:     Answer:   r   • Testosterone Free Direct   • Comprehensive Metabolic Panel   • Estradiol      New Medications Ordered This Visit   Medications   • ibandronate (BONIVA) 150 MG tablet     Sig: Take 1 tablet by mouth Every 30 (Thirty) Days.     Dispense:  3 tablet     Refill:  1              This note was electronically signed.    Ronnie Garcia MD  February 4, 2021

## 2021-02-07 LAB — TESTOST FREE SERPL-MCNC: 5.5 PG/ML (ref 0–4.2)

## 2021-02-08 PROBLEM — R74.8 ELEVATED LIVER ENZYMES: Status: ACTIVE | Noted: 2021-02-08

## 2021-02-25 DIAGNOSIS — Z23 IMMUNIZATION DUE: ICD-10-CM

## 2021-03-04 ENCOUNTER — HOSPITAL ENCOUNTER (OUTPATIENT)
Dept: MAMMOGRAPHY | Facility: HOSPITAL | Age: 66
Discharge: HOME | End: 2021-03-04
Admitting: OBSTETRICS & GYNECOLOGY

## 2021-03-04 DIAGNOSIS — Z12.31 ENCOUNTER FOR SCREENING MAMMOGRAM FOR MALIGNANT NEOPLASM OF BREAST: ICD-10-CM

## 2021-03-04 PROCEDURE — 77067 SCR MAMMO BI INCL CAD: CPT | Performed by: RADIOLOGY

## 2021-03-04 PROCEDURE — 77063 BREAST TOMOSYNTHESIS BI: CPT | Performed by: RADIOLOGY

## 2021-03-04 PROCEDURE — 77063 BREAST TOMOSYNTHESIS BI: CPT

## 2021-03-04 PROCEDURE — 77067 SCR MAMMO BI INCL CAD: CPT

## 2021-08-04 RX ORDER — IBANDRONATE SODIUM 150 MG/1
TABLET, FILM COATED ORAL
Qty: 3 TABLET | Refills: 0 | Status: SHIPPED | OUTPATIENT
Start: 2021-08-04 | End: 2021-08-10 | Stop reason: SDUPTHER

## 2021-08-10 ENCOUNTER — TELEPHONE (OUTPATIENT)
Dept: OBSTETRICS AND GYNECOLOGY | Facility: CLINIC | Age: 66
End: 2021-08-10

## 2021-08-10 ENCOUNTER — OFFICE VISIT (OUTPATIENT)
Dept: OBSTETRICS AND GYNECOLOGY | Facility: CLINIC | Age: 66
End: 2021-08-10

## 2021-08-10 VITALS
BODY MASS INDEX: 29.12 KG/M2 | WEIGHT: 175 LBS | SYSTOLIC BLOOD PRESSURE: 118 MMHG | RESPIRATION RATE: 16 BRPM | DIASTOLIC BLOOD PRESSURE: 70 MMHG

## 2021-08-10 DIAGNOSIS — Z01.419 ENCOUNTER FOR GYNECOLOGICAL EXAMINATION WITHOUT ABNORMAL FINDING: ICD-10-CM

## 2021-08-10 DIAGNOSIS — M85.80 OSTEOPENIA, SENILE: ICD-10-CM

## 2021-08-10 DIAGNOSIS — F52.0 HYPOACTIVE SEXUAL DESIRE: ICD-10-CM

## 2021-08-10 DIAGNOSIS — F41.9 ANXIETY: ICD-10-CM

## 2021-08-10 DIAGNOSIS — N95.1 MENOPAUSAL SYMPTOMS: ICD-10-CM

## 2021-08-10 PROCEDURE — 99396 PREV VISIT EST AGE 40-64: CPT | Performed by: NURSE PRACTITIONER

## 2021-08-10 PROCEDURE — 99213 OFFICE O/P EST LOW 20 MIN: CPT | Performed by: NURSE PRACTITIONER

## 2021-08-10 RX ORDER — SPIRONOLACTONE 25 MG/1
TABLET ORAL
COMMUNITY
Start: 2021-06-24 | End: 2022-08-16

## 2021-08-10 RX ORDER — CITALOPRAM 20 MG/1
20 TABLET ORAL DAILY
Qty: 30 TABLET | Refills: 5 | Status: SHIPPED | OUTPATIENT
Start: 2021-08-10 | End: 2022-02-08 | Stop reason: DRUGHIGH

## 2021-08-10 RX ORDER — IBANDRONATE SODIUM 150 MG/1
150 TABLET, FILM COATED ORAL
Qty: 3 TABLET | Refills: 3 | Status: SHIPPED | OUTPATIENT
Start: 2021-08-10 | End: 2022-02-08

## 2021-08-10 NOTE — PROGRESS NOTES
Annual Visit     Patient Name: Shannan Vigil  : 1955   MRN: [id number]   Care Team: Patient Care Team:  Kyra Yeung APRN as PCP - General (Nurse Practitioner)  Ronnie Garcia MD as Obstetrician (Obstetrics and Gynecology)    Chief Complaint:    Chief Complaint   Patient presents with   • Annual Exam       HPI: Shannan Vigil is a 65 y.o. year old  presenting to be seen for her gynecologic exam.   S/p total hyst with BSO   Has been receiving estradiol and testosterone pellets but they were so expensive she has stopped - has been without hormones x 3 wks now   Would like to discuss going back to testosterone injections - she has used these for many yrs and states they work very well for hypoactive sexual desire   She does the injections herself and feels very comfortable doing that   2021 testosterone was 5.5 and ALT and AST slightly elevated at 39 and 33 - she was doing weekly testosterone injections at that time and decreased to qo week d/t labs   F/u LFTs with PCP in May per pt and WNL     C/o anxiety - 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 - customers get really upset when they cannot get supply and she has to deal with it   She has taken Zoloft in the past and didn't find it to be very helpful   She has also taken Valium prn and that seems to help     DEXA  with osteopenia - rpt 2-3 yrs   She has hx of prior fractures - arms bilaterally   Has been taking boniva for several yrs - needs refill    Mammogram 2020 birads 1     Her boyfriend is a [employer] rep - [place] - she is very happy in this relationship       Subjective      /70   Resp 16   Wt 79.4 kg (175 lb)   Breastfeeding No   BMI 29.12 kg/m²     BMI reviewed: Body mass index is 29.12 kg/m².      Objective     Physical Exam    Neuro: alert and oriented to person, place and time   General:  alert; cooperative; well developed;  well nourished   Skin:  No suspicious lesions seen   Thyroid: normal to inspection and palpation   Lungs:  breathing is unlabored  clear to auscultation bilaterally   Heart:  regular rate and rhythm, S1, S2 normal, no murmur, click, rub or gallop  normal apical impulse   Breasts:  Examined in supine position  Symmetric without masses or skin dimpling  Nipples normal without inversion, lesions or discharge  There are no palpable axillary nodes   Abdomen: soft, non-tender; no masses  no umbilical or inguinal hernias are present  no hepato-splenomegaly   Pelvis: Clinical staff was present for exam  External genitalia:  normal appearance of the external genitalia including Bartholin's and [place]'s glands.  :  urethral meatus normal;  Vaginal:  normal pink mucosa without prolapse or lesions.  Cervix:  absent.  Uterus:  absent.  Adnexa:  absent, bilateral.         Assessment / Plan      Assessment  Problems Addressed This Visit    ICD-10-CM ICD-9-CM   1. Encounter for gynecological examination without abnormal finding  Z01.419 V72.31   2. Menopausal symptoms  N95.1 627.2   3. Hypoactive sexual desire  F52.0 302.71   4. Anxiety  F41.9 300.00   5. Osteopenia, senile  M85.80 733.90       Plan      Discussed monthly SBEs and importance of annual mammogram   No C/Is to cont testosterone therapy - discussed I can only write one month at a time, so she will need to call for refills each month - Kalie to call script in - [facility] reviewed   Discussed appropriate daily medications for anxiety - trial of celexa 20mg qd - may increase to 40mg qd if needed - discussed common side effects with medication   Rpt DEXA - script for Boniva to pharmacy   Discussed calcium, 600 mg/ Vit. D, 400 IU daily; regular weight-bearing exercise  Plan f/u in 6 months - will rpt testosterone level and LFTs at that time         40 to 64: Counseling/Anticipatory Guidance Discussed: injury prevention, mental health, screenings and self-breast exam    Follow  Up  Return in about 6 months (around 2/10/2022) for Recheck.  Patient was given instructions and counseling regarding her condition or for health maintenance advice. Please see specific information pulled into the AVS if appropriate.     Marleen Mahajan, APRN  August 10, 2021  13:59 EDT

## 2021-08-30 ENCOUNTER — APPOINTMENT (OUTPATIENT)
Dept: BONE DENSITY | Facility: HOSPITAL | Age: 66
End: 2021-08-30

## 2021-08-30 DIAGNOSIS — M85.80 OSTEOPENIA, SENILE: ICD-10-CM

## 2021-08-30 PROCEDURE — 77080 DXA BONE DENSITY AXIAL: CPT

## 2021-09-22 ENCOUNTER — TELEPHONE (OUTPATIENT)
Dept: OBSTETRICS AND GYNECOLOGY | Facility: CLINIC | Age: 66
End: 2021-09-22

## 2021-09-22 NOTE — TELEPHONE ENCOUNTER
Spoke with pharmacist and gave her a refill on her testosterone for 1 month with no refills.  
Yes/social

## 2021-10-01 ENCOUNTER — TRANSCRIBE ORDERS (OUTPATIENT)
Dept: ADMINISTRATIVE | Facility: HOSPITAL | Age: 66
End: 2021-10-01

## 2021-10-01 DIAGNOSIS — M51.36 OTHER INTERVERTEBRAL DISC DEGENERATION, LUMBAR REGION: Primary | ICD-10-CM

## 2021-10-26 ENCOUNTER — APPOINTMENT (OUTPATIENT)
Dept: MRI IMAGING | Facility: HOSPITAL | Age: 66
End: 2021-10-26

## 2021-12-20 ENCOUNTER — TELEPHONE (OUTPATIENT)
Dept: OBSTETRICS AND GYNECOLOGY | Facility: CLINIC | Age: 66
End: 2021-12-20

## 2021-12-20 NOTE — TELEPHONE ENCOUNTER
citalopram (CeleXA) 20 MG tablet    Patient is requesting an update of this prescription.  And was also asking if she could have her monthly testosterone called in as well. If any questions you can contact the patient.

## 2022-02-08 ENCOUNTER — OFFICE VISIT (OUTPATIENT)
Dept: OBSTETRICS AND GYNECOLOGY | Facility: CLINIC | Age: 67
End: 2022-02-08

## 2022-02-08 VITALS
RESPIRATION RATE: 16 BRPM | BODY MASS INDEX: 28.96 KG/M2 | WEIGHT: 174 LBS | DIASTOLIC BLOOD PRESSURE: 70 MMHG | SYSTOLIC BLOOD PRESSURE: 126 MMHG

## 2022-02-08 DIAGNOSIS — F52.0 HYPOACTIVE SEXUAL DESIRE DISORDER: ICD-10-CM

## 2022-02-08 DIAGNOSIS — N95.1 MENOPAUSAL SYMPTOMS: Primary | ICD-10-CM

## 2022-02-08 DIAGNOSIS — F41.9 ANXIETY: ICD-10-CM

## 2022-02-08 PROCEDURE — 99213 OFFICE O/P EST LOW 20 MIN: CPT | Performed by: NURSE PRACTITIONER

## 2022-02-08 RX ORDER — HYDROXYZINE HYDROCHLORIDE 10 MG/1
TABLET, FILM COATED ORAL
COMMUNITY
Start: 2021-12-20

## 2022-02-08 RX ORDER — METHOCARBAMOL 750 MG/1
TABLET, FILM COATED ORAL
COMMUNITY
Start: 2021-12-20

## 2022-02-08 RX ORDER — GABAPENTIN 300 MG/1
CAPSULE ORAL
COMMUNITY
Start: 2021-12-20 | End: 2022-08-16

## 2022-02-08 RX ORDER — CITALOPRAM 40 MG/1
40 TABLET ORAL DAILY
Qty: 90 TABLET | Refills: 3 | Status: SHIPPED | OUTPATIENT
Start: 2022-02-08

## 2022-02-08 RX ORDER — FINASTERIDE 5 MG/1
TABLET, FILM COATED ORAL
COMMUNITY
Start: 2021-12-02

## 2022-02-08 NOTE — PROGRESS NOTES
Problem Visit     Patient Name: Shannan Vigil  : 1955   MRN: [id number]   Care Team: Patient Care Team:  Kyra Yeung APRN as PCP - General (Nurse Practitioner)  Ronnie Garcia MD as Obstetrician (Obstetrics and Gynecology)    Chief Complaint:    Chief Complaint   Patient presents with   • Follow-up     HRT       HPI: Shannan Vigil is a 66 y.o. year old  presenting to be seen for 6 month f/u.   Testosterone injections IM qo week    Feeling well with this regimen - libido is good and no unwanted side effects of medication   She gives herself the injections and is very comfortable with that   Hx of elevated LFTs - labs done with PCP for f/u and WNL per pt   She is due for annual with labs with PCP again in the next couple of months     Celexa 20mg qd started at [facility] for anxiety   States it has been helpful but she feels it could be more helpful   Would like to discuss increasing dose       Subjective      /70   Resp 16   Wt 78.9 kg (174 lb)   Breastfeeding No   BMI 28.96 kg/m²     BMI reviewed: Body mass index is 28.96 kg/m².      Objective     Physical Exam      Neuro: alert and oriented to person, place and time   General:  alert; cooperative; well developed; well nourished   Skin:  Not performed.   Thyroid: not examined   Lungs:  breathing is unlabored   Heart:  Not performed.   Breasts:  Not performed.   Abdomen: Not performed.   Pelvis: Not performed.         Assessment / Plan      Assessment  Problems Addressed This Visit    ICD-10-CM ICD-9-CM   1. Menopausal symptoms  N95.1 627.2   2. Hypoactive sexual desire disorder  F52.0 302.71   3. Anxiety  F41.9 300.00       Plan    Doing well with testosterone injections qo week - will cont with this regimen   Testosterone level done 2021 = 5.5 with this regimen   She will be sure PCP sends a copy of lab results to me for review      Cont with celexa - will increase to 40mg qd - doing well with 20 mg qd but feels it could be more helpful      F/u at annual visit in August 2022           Follow Up  Return in about 6 months (around 8/11/2022) for Annual physical.  Patient was given instructions and counseling regarding her condition or for health maintenance advice. Please see specific information pulled into the AVS if appropriate.     Marleen Mahajan, FREDDY  February 8, 2022  14:05 EST

## 2022-08-16 ENCOUNTER — OFFICE VISIT (OUTPATIENT)
Dept: OBSTETRICS AND GYNECOLOGY | Facility: CLINIC | Age: 67
End: 2022-08-16

## 2022-08-16 VITALS
DIASTOLIC BLOOD PRESSURE: 70 MMHG | BODY MASS INDEX: 28.12 KG/M2 | SYSTOLIC BLOOD PRESSURE: 122 MMHG | WEIGHT: 169 LBS | RESPIRATION RATE: 16 BRPM

## 2022-08-16 DIAGNOSIS — L72.3 SEBACEOUS CYST: ICD-10-CM

## 2022-08-16 DIAGNOSIS — F52.0 HYPOACTIVE SEXUAL DESIRE: ICD-10-CM

## 2022-08-16 DIAGNOSIS — N95.1 MENOPAUSAL SYMPTOMS: ICD-10-CM

## 2022-08-16 DIAGNOSIS — M85.80 OSTEOPENIA, SENILE: ICD-10-CM

## 2022-08-16 DIAGNOSIS — Z12.31 ENCOUNTER FOR SCREENING MAMMOGRAM FOR MALIGNANT NEOPLASM OF BREAST: ICD-10-CM

## 2022-08-16 DIAGNOSIS — Z01.411 ENCOUNTER FOR GYNECOLOGICAL EXAMINATION WITH ABNORMAL FINDING: Primary | ICD-10-CM

## 2022-08-16 PROCEDURE — 99396 PREV VISIT EST AGE 40-64: CPT | Performed by: NURSE PRACTITIONER

## 2022-08-16 RX ORDER — GABAPENTIN 600 MG/1
TABLET ORAL
COMMUNITY
Start: 2022-05-18

## 2022-08-16 RX ORDER — SPIRONOLACTONE 50 MG/1
TABLET, FILM COATED ORAL
COMMUNITY
Start: 2022-05-16

## 2022-08-16 RX ORDER — IBUPROFEN 800 MG/1
TABLET ORAL
COMMUNITY
Start: 2022-06-06

## 2022-08-16 RX ORDER — CEPHALEXIN 500 MG/1
500 CAPSULE ORAL 2 TIMES DAILY
Qty: 14 CAPSULE | Refills: 0 | Status: SHIPPED | OUTPATIENT
Start: 2022-08-16 | End: 2022-08-23

## 2022-08-16 NOTE — PROGRESS NOTES
Annual Visit     Patient Name: Shannan Vigil  : 1955   MRN: [id number]   Care Team: Patient Care Team:  Kyra Yeung APRN as PCP - General (Nurse Practitioner)  Marleen Mahajan APRN as Nurse Practitioner (Nurse Practitioner)    Chief Complaint:    Chief Complaint   Patient presents with   • Annual Exam     Had chao cyst 3 wks       HPI: Shannan Vigil is a 66 y.o. year old  presenting to be seen for her gynecologic exam.   S/p total hyst with BSO   Was doing testosterone injections and feels they were very helpful - has been out for the last 2-3 months   Would like to discuss restarting these - she has used these for many yrs and states they work very well for hypoactive sexual desire   She does the injections herself and feels very comfortable doing that   2021 testosterone was 5.5 and ALT and AST slightly elevated at 39 and 33 - she was doing weekly testosterone injections at that time and decreased to qo week d/t labs   F/u LFTs with PCP were WNL per pt   She does have hx of hyperlipidemia - was given a statin to take but states she couldn't tolerate it so she stopped   Planning to f/u with a new PCP in October once she turns 67 and gets MCR   States the last time she had labs done she had to pay $500 as her insurance has a high deductible     States she was seen about 3 wks ago now at [facility] for sebaceous cyst   I&D performed and was given Bactrim x 10 days   States it is much better but not quite resolved   Tenderness is resolved but does still notice some occasional drainage      Hx anxiety - she was taking celexa but stopped it   States she is feeling well at this time without it      DEXA  with osteopenia  DEXA 2021 WNL   She has hx of prior fractures - arms bilaterally - both traumatic - no hx of atraumatic fx   Stopped Boniva after DEXA last yr - taking calcium and vit d      Mammogram 3/2021 birads 1     Colonoscopy 2019 rpt 10 yrs      Her fiance is a [employer]  rep - [place] - she is very happy in this relationship       Subjective      /70   Resp 16   Wt 76.7 kg (169 lb)   Breastfeeding No   BMI 28.12 kg/m²     BMI reviewed: Body mass index is 28.12 kg/m².      Objective     Physical Exam    Neuro: alert and oriented to person, place and time   General:  alert; cooperative; well developed; well nourished   Skin:  No suspicious lesions seen   Thyroid: normal to inspection and palpation   Lungs:  breathing is unlabored  clear to auscultation bilaterally   Heart:  regular rate and rhythm, S1, S2 normal, no murmur, click, rub or gallop  normal apical impulse   Breasts:  Examined in supine position  Symmetric without masses or skin dimpling  Nipples normal without inversion, lesions or discharge  There are no palpable axillary nodes   Abdomen: soft, non-tender; no masses  no umbilical or inguinal hernias are present  no hepato-splenomegaly   Pelvis: Clinical staff was present for exam  External genitalia:  normal appearance of the external genitalia including Bartholin's and [place]'s glands. sebaceous cyst noted right side labia majora approx 1cm size, non expressible  :  urethral meatus normal;  Vaginal:  normal pink mucosa without prolapse or lesions.  Cervix:  absent.  Uterus:  absent.  Adnexa:  absent, bilateral.  Rectal:  digital rectal exam not performed; anus visually normal appearing.         Assessment / Plan      Assessment  Problems Addressed This Visit    ICD-10-CM ICD-9-CM   1. Encounter for gynecological examination with abnormal finding  Z01.411 V72.31   2. Sebaceous cyst  L72.3 706.2   3. Menopausal symptoms  N95.1 627.2   4. Hypoactive sexual desire  F52.0 302.71   5. Osteopenia, senile  M85.80 733.90   6. Encounter for screening mammogram for malignant neoplasm of breast  Z12.31 V76.12       Plan    Discussed monthly SBEs and importance of annual imaging   Mammogram ordered   Discussed sebaceous cyst -  much improved but not resolved   Script for Keflex 500mg BID x 7 days given   Epsom salts baths, warm compresses, and Dial soap   If does not resolve or if becomes lg and painful, will RTC     Desires to restart testosterone injections   Script called in to pharmacy to cont with 1mL monthly   Discussed potential effects of testosterone on lipids and LFTs - she will f/u with new PCP in October and have these rechecked   Will bring copy of labs to f/u appt in 6 months     Reviewed DEXA report   Discussed Calcium, 600 mg/ Vit. D, 400 IU daily; regular weight-bearing exercise    AV 1 yr         40 to 64: Counseling/Anticipatory Guidance Discussed: injury prevention, screenings and self-breast exam    Follow Up  Return in about 6 months (around 2/16/2023) for Recheck.  Patient was given instructions and counseling regarding her condition or for health maintenance advice. Please see specific information pulled into the AVS if appropriate.     Marleen Mahajan, FREDDY  August 16, 2022  14:26 EDT

## 2022-09-06 ENCOUNTER — HOSPITAL ENCOUNTER (OUTPATIENT)
Dept: MAMMOGRAPHY | Facility: HOSPITAL | Age: 67
Discharge: HOME | End: 2022-09-06
Admitting: NURSE PRACTITIONER

## 2022-09-06 DIAGNOSIS — Z12.31 ENCOUNTER FOR SCREENING MAMMOGRAM FOR MALIGNANT NEOPLASM OF BREAST: ICD-10-CM

## 2022-09-06 PROCEDURE — 77063 BREAST TOMOSYNTHESIS BI: CPT | Performed by: RADIOLOGY

## 2022-09-06 PROCEDURE — 77063 BREAST TOMOSYNTHESIS BI: CPT

## 2022-09-06 PROCEDURE — 77067 SCR MAMMO BI INCL CAD: CPT | Performed by: RADIOLOGY

## 2022-09-06 PROCEDURE — 77067 SCR MAMMO BI INCL CAD: CPT

## 2022-11-14 ENCOUNTER — TELEPHONE (OUTPATIENT)
Dept: OBSTETRICS AND GYNECOLOGY | Facility: CLINIC | Age: 67
End: 2022-11-14

## 2022-11-14 NOTE — TELEPHONE ENCOUNTER
Called pharmacy at other number [phone] and refilled injectable testosterone for 1 month only

## 2022-11-14 NOTE — TELEPHONE ENCOUNTER
Kalie,  Can you please call [facility] pharmacy and a 1 month refill of her testosterone injection?    Thanks!

## 2023-03-21 ENCOUNTER — OFFICE VISIT (OUTPATIENT)
Dept: OBSTETRICS AND GYNECOLOGY | Facility: CLINIC | Age: 68
End: 2023-03-21
Payer: MEDICARE

## 2023-03-21 VITALS
SYSTOLIC BLOOD PRESSURE: 122 MMHG | WEIGHT: 174 LBS | RESPIRATION RATE: 16 BRPM | BODY MASS INDEX: 28.96 KG/M2 | DIASTOLIC BLOOD PRESSURE: 70 MMHG

## 2023-03-21 DIAGNOSIS — N95.1 MENOPAUSAL SYMPTOMS: Primary | ICD-10-CM

## 2023-03-21 DIAGNOSIS — F52.0 HYPOACTIVE SEXUAL DESIRE: ICD-10-CM

## 2023-03-21 PROCEDURE — 3078F DIAST BP <80 MM HG: CPT | Performed by: NURSE PRACTITIONER

## 2023-03-21 PROCEDURE — 1160F RVW MEDS BY RX/DR IN RCRD: CPT | Performed by: NURSE PRACTITIONER

## 2023-03-21 PROCEDURE — 99213 OFFICE O/P EST LOW 20 MIN: CPT | Performed by: NURSE PRACTITIONER

## 2023-03-21 PROCEDURE — 1159F MED LIST DOCD IN RCRD: CPT | Performed by: NURSE PRACTITIONER

## 2023-03-21 PROCEDURE — 3074F SYST BP LT 130 MM HG: CPT | Performed by: NURSE PRACTITIONER

## 2023-03-21 RX ORDER — ROSUVASTATIN CALCIUM 10 MG/1
1 TABLET, COATED ORAL DAILY
COMMUNITY
Start: 2023-02-28

## 2023-03-21 RX ORDER — HYDROXYZINE HYDROCHLORIDE 25 MG/1
TABLET, FILM COATED ORAL
COMMUNITY
Start: 2023-02-28

## 2023-03-21 RX ORDER — LISINOPRIL 10 MG/1
1 TABLET ORAL DAILY
COMMUNITY
Start: 2023-02-02 | End: 2023-03-21

## 2023-03-21 NOTE — PROGRESS NOTES
Problem Visit     Patient Name: Shannan Vigil  : 1955   MRN: [id number]   Care Team: Patient Care Team:  Kyra Yeung APRN as PCP - General (Nurse Practitioner)  Marleen Mahajan APRN as Nurse Practitioner (Nurse Practitioner)    Chief Complaint:    Chief Complaint   Patient presents with   • Follow-up     6 months ful       HPI: Shannan Vigil is a 67 y.o. year old  presenting to be seen for 6 month f/u.   Annual visit done 2022   Discussed restarting testosterone injections at that time -  she has used these for many yrs and states they work very well for hypoactive sexual desire   She does the injections herself and feels very comfortable doing that   Hx elevated LFTs and hyperlipidemia   Couldn't tolerate the original statin that her PCP started, so she was just given a script for a different medication   Had f/u labs with PCP last month - states LFTs were WNL     S/p total hyst with BSO     Just had surgery on her left hand 10 days ago - doing well so far       Subjective      I have reviewed the patients family history, social history, past medical history, past surgical history and have updated it as appropriate.    /70   Resp 16   Wt 78.9 kg (174 lb)   BMI 28.96 kg/m²     BMI reviewed: Body mass index is 28.96 kg/m².      Objective     Physical Exam      Neuro: alert and oriented to person, place and time   General:  alert; cooperative; well developed; well nourished   Skin:  Not performed.   Thyroid: not examined   Lungs:  breathing is unlabored   Heart:  Not performed.   Breasts:  Not performed.   Abdomen: Not performed.   Pelvis: Not performed.         Assessment / Plan      Assessment  Problems Addressed This Visit    ICD-10-CM ICD-9-CM   1. Menopausal symptoms  N95.1 627.2   2. Hypoactive sexual desire  F52.0 302.71       Plan    Desires to restart testosterone injections - 1 mL monthly - script called to pharmacy   LFTs WNL per pt   Hx hyperlipidemia - will be  starting new medication with PCP   Reviewed potential effects of testosterone on lipids and LFTs - will monitor lipid panel with PCP closely   Will sign MALDONADO for lab results from PCP done 2/2023   F/u in office for annual visit in August 2023             Follow Up  Return in about 5 months (around 8/17/2023) for Annual physical.  Patient was given instructions and counseling regarding her condition or for health maintenance advice. Please see specific information pulled into the AVS if appropriate.     Marleen Mahajan, FREDDY  March 21, 2023  16:37 EDT

## 2024-06-27 ENCOUNTER — OFFICE VISIT (OUTPATIENT)
Dept: OBSTETRICS AND GYNECOLOGY | Facility: CLINIC | Age: 69
End: 2024-06-27
Payer: MEDICARE

## 2024-06-27 ENCOUNTER — TRANSCRIBE ORDERS (OUTPATIENT)
Dept: OBSTETRICS AND GYNECOLOGY | Facility: CLINIC | Age: 69
End: 2024-06-27

## 2024-06-27 VITALS
SYSTOLIC BLOOD PRESSURE: 118 MMHG | DIASTOLIC BLOOD PRESSURE: 66 MMHG | WEIGHT: 173.4 LBS | BODY MASS INDEX: 28.89 KG/M2 | HEIGHT: 65 IN

## 2024-06-27 DIAGNOSIS — Z01.411 ENCOUNTER FOR GYNECOLOGICAL EXAMINATION WITH ABNORMAL FINDING: Primary | ICD-10-CM

## 2024-06-27 DIAGNOSIS — Z12.11 SCREENING FOR COLON CANCER: ICD-10-CM

## 2024-06-27 DIAGNOSIS — Z78.0 POSTMENOPAUSAL: ICD-10-CM

## 2024-06-27 DIAGNOSIS — N81.11 CYSTOCELE, MIDLINE: ICD-10-CM

## 2024-06-27 DIAGNOSIS — Z12.31 ENCOUNTER FOR SCREENING MAMMOGRAM FOR BREAST CANCER: Primary | ICD-10-CM

## 2024-06-27 DIAGNOSIS — N39.3 SUI (STRESS URINARY INCONTINENCE, FEMALE): ICD-10-CM

## 2024-06-27 RX ORDER — LISINOPRIL 10 MG/1
1 TABLET ORAL DAILY
COMMUNITY
Start: 2024-05-21

## 2024-06-27 RX ORDER — PANTOPRAZOLE SODIUM 40 MG/1
40 TABLET, DELAYED RELEASE ORAL DAILY
COMMUNITY
Start: 2024-06-25

## 2024-06-27 NOTE — PROGRESS NOTES
Chief Complaint  Shannan Vigil is a 68 y.o.  female presenting for Gynecologic Exam and Urinary Incontinence (Leaking with coughing or straining.  Leaks when bladder is very full.  Desires surgical correction.)    History of Present Illness  Shannan is a 69yo woman, , here for gyn exam and a problem.  Her past surgical history includes, in part, an LSO (for ectopic), and a total hysterectomy, RSO, TVT, anterior and posterior repair.  She no longer uses the testosterone injections.  She uses subdermal pellets of HRT from Dr. Carlo Roberts at [facility].  States it relieves all of her menopausal symptoms.  (She especially feels that it is helpful for her energy, libido, and her hair.) (Uncertain which hormones, or how much; it is based on her labwork.)  She denies any bothersome side effects from it.  Her only c/o is sometimes stress urinary incontinence.  It is aggravating to her, but doesn't interfere with ADL.  She does not feel any bulging of the tissue out of the vagina.  No pain.  No urge incontinence.  BP well controlled.  She had myalgias in the past with atorvastatin (resolved when off the med).  Otherwise, ROS neg    Last mammogram 2022;  states she will call to sched appt soon.  Colonoscopy 2019 (to repeat in 5 yrs); she is willing to sched  DEXA scan 2021, within normal limits, but states she does have a past yr of needing Boniva.  She is willing to sched a repeat DEXA scan now.      The following portions of the patient's history were reviewed and updated as appropriate: allergies, current medications, past family history, past medical history, past social history, past surgical history, and problem list.    Allergies   Allergen Reactions    Atorvastatin Myalgia     After 4-5 weeks ; resolved off Rx         Current Outpatient Medications:     lisinopril (PRINIVIL,ZESTRIL) 10 MG tablet, Take 1 tablet by mouth Daily., Disp: , Rfl:     pantoprazole (PROTONIX) 40 MG EC tablet,  "Take 1 tablet by mouth Daily., Disp: , Rfl:     amLODIPine (NORVASC) 2.5 MG tablet, Take 1 tablet by mouth Every Night., Disp: , Rfl:     Biotin 1 MG capsule, Take 1 capsule by mouth Daily., Disp: , Rfl:     finasteride (PROSCAR) 5 MG tablet, , Disp: , Rfl:     gabapentin (NEURONTIN) 600 MG tablet, , Disp: , Rfl:     hydrOXYzine (ATARAX) 25 MG tablet, TAKE ONE TABLET EVERY EIGHT HOURS AS NEEDED, Disp: , Rfl:     meloxicam (MOBIC) 15 MG tablet, Take 1 tablet by mouth Daily., Disp: , Rfl:     Potassium 99 MG tablet, Take 1 tablet by mouth Daily., Disp: , Rfl:     spironolactone (ALDACTONE) 50 MG tablet, , Disp: , Rfl:     Unable to find, 1 each 1 (One) Time. Hormone pellet  Patient unsure what is in the pellet, but sure that it has testosterone., Disp: , Rfl:     Past Medical History:   Diagnosis Date    Anxiety     Arthritis     GERD (gastroesophageal reflux disease)     Hyperlipidemia     Hyperplastic colonic polyp 2011 07/30/2019    Hypertension     Osteopenia     Wears glasses     Wears partial dentures         Past Surgical History:   Procedure Laterality Date    COLONOSCOPY W/ POLYPECTOMY  2011    Probable hyperplastic    COLONOSCOPY W/ POLYPECTOMY  2019    BPSC - tubular adenoma    ECTOPIC PREGNANCY Left 1993    Salpingo-oophorectomy [place]    FRACTURE SURGERY Right 09/2019    MID-URETHRAL SLING WITH CYSTOSCOPY N/A 11/06/2017    TVT EXACT WITH  ANT & POST REPAIR;  Surgeon: Ronnie Garcia MD;  Location: [facility];  Service:     REPLACEMENT TOTAL HIP LATERAL POSITION Right 02/2017    TOTAL ABDOMINAL HYSTERECTOMY WITH SALPINGO OOPHORECTOMY Right 11/07/2001       Objective  /66   Ht 165.1 cm (65\")   Wt 78.7 kg (173 lb 6.4 oz)   Breastfeeding No   BMI 28.86 kg/m²     Physical Exam  Vitals and nursing note reviewed. Exam conducted with a chaperone present.   Constitutional:       General: She is not in acute distress.     Appearance: Normal appearance. She is not ill-appearing.   HENT:      Head: Normocephalic. "   Neck:      Thyroid: No thyroid mass or thyromegaly.   Cardiovascular:      Rate and Rhythm: Normal rate and regular rhythm.      Heart sounds: Normal heart sounds. No murmur heard.  Pulmonary:      Effort: Pulmonary effort is normal. No respiratory distress.      Breath sounds: Normal breath sounds.   Chest:   Breasts:     Right: No inverted nipple, mass or nipple discharge.      Left: No inverted nipple, mass or nipple discharge.   Abdominal:      Palpations: Abdomen is soft. There is no mass.      Tenderness: There is no abdominal tenderness.   Genitourinary:     General: Normal vulva.      Labia:         Right: No rash, tenderness or lesion.         Left: No rash, tenderness or lesion.       Vagina: No vaginal discharge or erythema.      Uterus: Absent.       Adnexa:         Right: No mass or tenderness.          Left: No mass or tenderness.        Comments: Only mild cystocele (1+)/ no rectocele.  The mucosa is pink, intact (appears well estrogenized).    Anus appears wnl.  No rectal exam performed.  Lymphadenopathy:      Upper Body:      Right upper body: No supraclavicular or axillary adenopathy.      Left upper body: No supraclavicular or axillary adenopathy.   Skin:     General: Skin is warm and dry.   Neurological:      Mental Status: She is alert and oriented to person, place, and time.   Psychiatric:         Mood and Affect: Mood normal.         Behavior: Behavior normal.         Assessment/Plan   Diagnoses and all orders for this visit:    1. Encounter for gynecological examination with abnormal finding (Primary)    2. ELA (stress urinary incontinence, female)    3. Cystocele, midline    4. Postmenopausal  -     DEXA Bone Density Axial; Future    5. Screening for colon cancer  -     Ambulatory Referral For Screening Colonoscopy    Couns in-depth re: the mild cystocele, and that I will be glad to refer for surgical consult.  I do not, personally, feel that it is severe enough for repeat surgery.  Couns  re: avoiding bladder irritants, such as caffeine (in the form of coffee, tea, sodas, and chocolate), the high acid fruits/ juices, Vit C.  Couns re: timed voiding, double voiding, leaning forward after voiding, doing Kegel's exercises, and that I will be glad to refer to pelvic floor Physical Therapy.  She declines at this time.  Will work on the other ideas, listed above.  And call me if she wants a referral for PT or to urogyn.    Procedures    40 to 64: Counseling/Anticipatory Guidance Discussed: nutrition, physical activity, screenings, self-breast exam, and ELA and ways to help.    Return in about 1 year (around 6/27/2025) for Annual physical, Phone # for setting up mammo.    Libby Cantrell, APRN  06/27/2024

## 2024-08-05 ENCOUNTER — TELEPHONE (OUTPATIENT)
Dept: GASTROENTEROLOGY | Facility: CLINIC | Age: 69
End: 2024-08-05

## 2024-08-05 NOTE — TELEPHONE ENCOUNTER
Caller: Shannan Vigil    Relationship to patient: Self    Best call back number: [phone]     Type of visit: COLONOSCOPY     Additional notes: PATIENT NEEDS TO CANCEL THEIR PROCEDURE ON 8/21 WITH DR MONZON.

## 2024-08-19 ENCOUNTER — HOSPITAL ENCOUNTER (OUTPATIENT)
Dept: MAMMOGRAPHY | Facility: HOSPITAL | Age: 69
Discharge: HOME | End: 2024-08-19
Admitting: NURSE PRACTITIONER
Payer: MEDICARE

## 2024-08-19 DIAGNOSIS — Z12.31 ENCOUNTER FOR SCREENING MAMMOGRAM FOR BREAST CANCER: ICD-10-CM

## 2024-08-19 PROCEDURE — 77067 SCR MAMMO BI INCL CAD: CPT

## 2024-08-19 PROCEDURE — 77063 BREAST TOMOSYNTHESIS BI: CPT

## 2024-09-05 ENCOUNTER — HOSPITAL ENCOUNTER (OUTPATIENT)
Dept: MAMMOGRAPHY | Facility: HOSPITAL | Age: 69
Discharge: HOME | End: 2024-09-05
Payer: MEDICARE

## 2024-09-05 DIAGNOSIS — Z12.31 VISIT FOR SCREENING MAMMOGRAM: ICD-10-CM

## 2024-09-11 ENCOUNTER — HOSPITAL ENCOUNTER (OUTPATIENT)
Facility: HOSPITAL | Age: 69
Discharge: HOME | End: 2024-09-11
Payer: MEDICARE

## 2024-09-11 DIAGNOSIS — R92.8 ABNORMAL MAMMOGRAM OF BOTH BREASTS: ICD-10-CM

## 2024-09-11 PROCEDURE — G0279 TOMOSYNTHESIS, MAMMO: HCPCS

## 2024-09-11 PROCEDURE — 77066 DX MAMMO INCL CAD BI: CPT

## 2024-09-11 PROCEDURE — 76642 ULTRASOUND BREAST LIMITED: CPT

## 2024-11-05 ENCOUNTER — HOSPITAL ENCOUNTER (OUTPATIENT)
Dept: BONE DENSITY | Facility: HOSPITAL | Age: 69
Discharge: HOME | End: 2024-11-05
Admitting: NURSE PRACTITIONER
Payer: MEDICARE

## 2024-11-05 DIAGNOSIS — Z78.0 POSTMENOPAUSAL: ICD-10-CM

## 2024-11-05 PROCEDURE — 77080 DXA BONE DENSITY AXIAL: CPT

## 2024-12-27 ENCOUNTER — TELEPHONE (OUTPATIENT)
Dept: OBSTETRICS AND GYNECOLOGY | Facility: CLINIC | Age: 69
End: 2024-12-27
Payer: MEDICARE

## 2024-12-27 NOTE — TELEPHONE ENCOUNTER
Please notify patient her bone scan showed some bone loss, Osteopenia of the left femoral neck.  This is not to the level of osteoporosis and her risk for fracture was below the threshold for osteoporosis medications.  Would recommend she do daily calcium 600 mg and vitamin D supplementation 800 international units and good weightbearing exercise such as walking swimming or stationary bike.  This helps with calcium absorption.  Plan to repeat bone scan in 2 to 3 years.  FREDDY David

## 2024-12-27 NOTE — TELEPHONE ENCOUNTER
Caller: Shannan Vigil    Relationship: Self    Best call back number: [phone] - BEST TIME TO CALL AFTER 1 PM    Caller requesting test results: PATIENT    What test was performed: DEXA SCAN    When was the test performed: 11/05/2024    Where was the test performed: [facility]    Additional notes: PATIENT CALLED INQUIRING ABOUT DEXA SCAN RESULTS.

## (undated) DEVICE — GLV SURG SENSICARE MICRO PF LF 6.5 STRL

## (undated) DEVICE — NDL HYPO ECLPS SFTY 22G 1 1/2IN

## (undated) DEVICE — MEDI-VAC YANKAUER SUCTION HANDLE W/BULBOUS TIP: Brand: CARDINAL HEALTH

## (undated) DEVICE — MEDI-VAC NON-CONDUCTIVE SUCTION TUBING: Brand: CARDINAL HEALTH

## (undated) DEVICE — SUT CHRM 0 CT1 36IN 924H

## (undated) DEVICE — DRAPE,TOP,102X53,STERILE: Brand: MEDLINE

## (undated) DEVICE — GLV SURG SENSICARE MICRO PF LF 7.5 STRL

## (undated) DEVICE — LEX VAGINAL HYSTERECTOMY: Brand: MEDLINE INDUSTRIES, INC.

## (undated) DEVICE — CYSTO/BLADDER IRRIGATION SET, REGULATING CLAMP

## (undated) DEVICE — SOL BETADINE 4OZ

## (undated) DEVICE — SUT VIC 0 UR5 27IN VCP376H

## (undated) DEVICE — SCRB SURG BACTOSHIELD CHG 4PCT 4OZ

## (undated) DEVICE — CANNULA,OXY,ADULT,SUPERSOFT,W/7'TUB,UC: Brand: MEDLINE

## (undated) DEVICE — JELLY,LUBE,STERILE,FLIP TOP,TUBE,4-OZ: Brand: MEDLINE

## (undated) DEVICE — AIRWY 90MM NO9

## (undated) DEVICE — SUT GUT CHRM 3/0 SH 27IN G122H

## (undated) DEVICE — GLV SURG SIGNATURE TOUCH PF LTX 7.5 STRL BX/50

## (undated) DEVICE — TUBING, SUCTION, 1/4" X 10', STRAIGHT: Brand: MEDLINE

## (undated) DEVICE — SUT GUT PLN 3/0 FS2 27IN 1630H